# Patient Record
Sex: FEMALE | Race: OTHER | HISPANIC OR LATINO | URBAN - METROPOLITAN AREA
[De-identification: names, ages, dates, MRNs, and addresses within clinical notes are randomized per-mention and may not be internally consistent; named-entity substitution may affect disease eponyms.]

---

## 2017-02-16 ENCOUNTER — INPATIENT (INPATIENT)
Facility: HOSPITAL | Age: 37
LOS: 2 days | Discharge: HOME | End: 2017-02-19
Attending: OBSTETRICS & GYNECOLOGY | Admitting: OBSTETRICS & GYNECOLOGY
Payer: COMMERCIAL

## 2017-02-16 VITALS — WEIGHT: 191.8 LBS | HEIGHT: 62 IN

## 2017-02-16 DIAGNOSIS — O41.00X0 OLIGOHYDRAMNIOS, UNSPECIFIED TRIMESTER, NOT APPLICABLE OR UNSPECIFIED: ICD-10-CM

## 2017-02-16 LAB
BASOPHILS # BLD AUTO: 0.03 K/UL — SIGNIFICANT CHANGE UP (ref 0–0.2)
BASOPHILS NFR BLD AUTO: 0.2 % — SIGNIFICANT CHANGE UP (ref 0–2)
BLD GP AB SCN SERPL QL: NEGATIVE — SIGNIFICANT CHANGE UP
EOSINOPHIL # BLD AUTO: 0.12 K/UL — SIGNIFICANT CHANGE UP (ref 0–0.5)
EOSINOPHIL NFR BLD AUTO: 1 % — SIGNIFICANT CHANGE UP (ref 0–6)
HCT VFR BLD CALC: 36.3 % — SIGNIFICANT CHANGE UP (ref 34.5–45)
HGB BLD-MCNC: 11.7 G/DL — SIGNIFICANT CHANGE UP (ref 11.5–15.5)
IMM GRANULOCYTES NFR BLD AUTO: 0.7 % — SIGNIFICANT CHANGE UP (ref 0–1.5)
LYMPHOCYTES # BLD AUTO: 2.6 K/UL — SIGNIFICANT CHANGE UP (ref 1–3.3)
LYMPHOCYTES # BLD AUTO: 21.3 % — SIGNIFICANT CHANGE UP (ref 13–44)
MCHC RBC-ENTMCNC: 26.6 PG — LOW (ref 27–34)
MCHC RBC-ENTMCNC: 32.2 % — SIGNIFICANT CHANGE UP (ref 32–36)
MCV RBC AUTO: 82.5 FL — SIGNIFICANT CHANGE UP (ref 80–100)
MONOCYTES # BLD AUTO: 0.84 K/UL — SIGNIFICANT CHANGE UP (ref 0–0.9)
MONOCYTES NFR BLD AUTO: 6.9 % — SIGNIFICANT CHANGE UP (ref 2–14)
NEUTROPHILS # BLD AUTO: 8.52 K/UL — HIGH (ref 1.8–7.4)
NEUTROPHILS NFR BLD AUTO: 69.9 % — SIGNIFICANT CHANGE UP (ref 43–77)
PLATELET # BLD AUTO: 323 K/UL — SIGNIFICANT CHANGE UP (ref 150–400)
PMV BLD: 10.1 FL — SIGNIFICANT CHANGE UP (ref 7–13)
RBC # BLD: 4.4 M/UL — SIGNIFICANT CHANGE UP (ref 3.8–5.2)
RBC # FLD: 15.7 % — HIGH (ref 10.3–14.5)
RH IG SCN BLD-IMP: POSITIVE — SIGNIFICANT CHANGE UP
RH IG SCN BLD-IMP: POSITIVE — SIGNIFICANT CHANGE UP
WBC # BLD: 12.19 K/UL — HIGH (ref 3.8–10.5)
WBC # FLD AUTO: 12.19 K/UL — HIGH (ref 3.8–10.5)

## 2017-02-16 RX ORDER — SODIUM CHLORIDE 9 MG/ML
1000 INJECTION, SOLUTION INTRAVENOUS ONCE
Qty: 0 | Refills: 0 | Status: COMPLETED | OUTPATIENT
Start: 2017-02-16 | End: 2017-02-16

## 2017-02-16 RX ORDER — OXYTOCIN 10 UNIT/ML
2 VIAL (ML) INJECTION
Qty: 30 | Refills: 0 | Status: DISCONTINUED | OUTPATIENT
Start: 2017-02-16 | End: 2017-02-17

## 2017-02-16 RX ORDER — PENICILLIN G POTASSIUM 5000000 [IU]/1
POWDER, FOR SOLUTION INTRAMUSCULAR; INTRAPLEURAL; INTRATHECAL; INTRAVENOUS
Qty: 0 | Refills: 0 | Status: DISCONTINUED | OUTPATIENT
Start: 2017-02-16 | End: 2017-02-17

## 2017-02-16 RX ORDER — SODIUM CHLORIDE 9 MG/ML
1000 INJECTION, SOLUTION INTRAVENOUS
Qty: 0 | Refills: 0 | Status: DISCONTINUED | OUTPATIENT
Start: 2017-02-16 | End: 2017-02-17

## 2017-02-16 RX ORDER — PENICILLIN G POTASSIUM 5000000 [IU]/1
2.5 POWDER, FOR SOLUTION INTRAMUSCULAR; INTRAPLEURAL; INTRATHECAL; INTRAVENOUS EVERY 4 HOURS
Qty: 0 | Refills: 0 | Status: DISCONTINUED | OUTPATIENT
Start: 2017-02-16 | End: 2017-02-17

## 2017-02-16 RX ORDER — OXYTOCIN 10 UNIT/ML
333.33 VIAL (ML) INJECTION
Qty: 20 | Refills: 0 | Status: COMPLETED | OUTPATIENT
Start: 2017-02-16

## 2017-02-16 RX ORDER — OXYTOCIN 10 UNIT/ML
333.33 VIAL (ML) INJECTION
Qty: 20 | Refills: 0 | Status: COMPLETED | OUTPATIENT
Start: 2017-02-16 | End: 2017-02-16

## 2017-02-16 RX ORDER — PENICILLIN G POTASSIUM 5000000 [IU]/1
5 POWDER, FOR SOLUTION INTRAMUSCULAR; INTRAPLEURAL; INTRATHECAL; INTRAVENOUS ONCE
Qty: 0 | Refills: 0 | Status: COMPLETED | OUTPATIENT
Start: 2017-02-16 | End: 2017-02-16

## 2017-02-16 RX ORDER — CITRIC ACID/SODIUM CITRATE 300-500 MG
15 SOLUTION, ORAL ORAL EVERY 4 HOURS
Qty: 0 | Refills: 0 | Status: DISCONTINUED | OUTPATIENT
Start: 2017-02-16 | End: 2017-02-17

## 2017-02-16 RX ADMIN — Medication 2 MILLIUNIT(S)/MIN: at 16:30

## 2017-02-16 RX ADMIN — SODIUM CHLORIDE 125 MILLILITER(S): 9 INJECTION, SOLUTION INTRAVENOUS at 16:20

## 2017-02-16 RX ADMIN — PENICILLIN G POTASSIUM 200 MILLION UNIT(S): 5000000 POWDER, FOR SOLUTION INTRAMUSCULAR; INTRAPLEURAL; INTRATHECAL; INTRAVENOUS at 20:36

## 2017-02-16 RX ADMIN — SODIUM CHLORIDE 2000 MILLILITER(S): 9 INJECTION, SOLUTION INTRAVENOUS at 22:35

## 2017-02-16 RX ADMIN — SODIUM CHLORIDE 125 MILLILITER(S): 9 INJECTION, SOLUTION INTRAVENOUS at 20:36

## 2017-02-16 RX ADMIN — Medication 2 MILLIUNIT(S)/MIN: at 20:36

## 2017-02-16 RX ADMIN — PENICILLIN G POTASSIUM 200 MILLION UNIT(S): 5000000 POWDER, FOR SOLUTION INTRAMUSCULAR; INTRAPLEURAL; INTRATHECAL; INTRAVENOUS at 16:20

## 2017-02-17 ENCOUNTER — TRANSCRIPTION ENCOUNTER (OUTPATIENT)
Age: 37
End: 2017-02-17

## 2017-02-17 LAB
BASOPHILS # BLD AUTO: 0.01 K/UL — SIGNIFICANT CHANGE UP (ref 0–0.2)
BASOPHILS NFR BLD AUTO: 0.1 % — SIGNIFICANT CHANGE UP (ref 0–2)
EOSINOPHIL # BLD AUTO: 0.08 K/UL — SIGNIFICANT CHANGE UP (ref 0–0.5)
EOSINOPHIL NFR BLD AUTO: 0.4 % — SIGNIFICANT CHANGE UP (ref 0–6)
HCT VFR BLD CALC: 30.4 % — LOW (ref 34.5–45)
HGB BLD-MCNC: 9.7 G/DL — LOW (ref 11.5–15.5)
IMM GRANULOCYTES NFR BLD AUTO: 0.5 % — SIGNIFICANT CHANGE UP (ref 0–1.5)
LYMPHOCYTES # BLD AUTO: 13.3 % — SIGNIFICANT CHANGE UP (ref 13–44)
LYMPHOCYTES # BLD AUTO: 2.51 K/UL — SIGNIFICANT CHANGE UP (ref 1–3.3)
MCHC RBC-ENTMCNC: 26.1 PG — LOW (ref 27–34)
MCHC RBC-ENTMCNC: 31.9 % — LOW (ref 32–36)
MCV RBC AUTO: 81.9 FL — SIGNIFICANT CHANGE UP (ref 80–100)
MONOCYTES # BLD AUTO: 1.29 K/UL — HIGH (ref 0–0.9)
MONOCYTES NFR BLD AUTO: 6.8 % — SIGNIFICANT CHANGE UP (ref 2–14)
NEUTROPHILS # BLD AUTO: 14.94 K/UL — HIGH (ref 1.8–7.4)
NEUTROPHILS NFR BLD AUTO: 78.9 % — HIGH (ref 43–77)
PLATELET # BLD AUTO: 279 K/UL — SIGNIFICANT CHANGE UP (ref 150–400)
PMV BLD: 9.7 FL — SIGNIFICANT CHANGE UP (ref 7–13)
RBC # BLD: 3.71 M/UL — LOW (ref 3.8–5.2)
RBC # FLD: 15.6 % — HIGH (ref 10.3–14.5)
T PALLIDUM AB TITR SER: NEGATIVE — SIGNIFICANT CHANGE UP
WBC # BLD: 18.92 K/UL — HIGH (ref 3.8–10.5)
WBC # FLD AUTO: 18.92 K/UL — HIGH (ref 3.8–10.5)

## 2017-02-17 PROCEDURE — 93010 ELECTROCARDIOGRAM REPORT: CPT

## 2017-02-17 RX ORDER — OXYTOCIN 10 UNIT/ML
41.67 VIAL (ML) INJECTION
Qty: 20 | Refills: 0 | Status: DISCONTINUED | OUTPATIENT
Start: 2017-02-17 | End: 2017-02-17

## 2017-02-17 RX ORDER — SODIUM CHLORIDE 9 MG/ML
3 INJECTION INTRAMUSCULAR; INTRAVENOUS; SUBCUTANEOUS EVERY 8 HOURS
Qty: 0 | Refills: 0 | Status: DISCONTINUED | OUTPATIENT
Start: 2017-02-17 | End: 2017-02-17

## 2017-02-17 RX ORDER — MAGNESIUM HYDROXIDE 400 MG/1
30 TABLET, CHEWABLE ORAL
Qty: 0 | Refills: 0 | Status: DISCONTINUED | OUTPATIENT
Start: 2017-02-17 | End: 2017-02-19

## 2017-02-17 RX ORDER — OXYCODONE HYDROCHLORIDE 5 MG/1
5 TABLET ORAL
Qty: 0 | Refills: 0 | Status: DISCONTINUED | OUTPATIENT
Start: 2017-02-17 | End: 2017-02-19

## 2017-02-17 RX ORDER — OXYCODONE HYDROCHLORIDE 5 MG/1
5 TABLET ORAL EVERY 4 HOURS
Qty: 0 | Refills: 0 | Status: DISCONTINUED | OUTPATIENT
Start: 2017-02-17 | End: 2017-02-19

## 2017-02-17 RX ORDER — IBUPROFEN 200 MG
600 TABLET ORAL EVERY 6 HOURS
Qty: 0 | Refills: 0 | Status: DISCONTINUED | OUTPATIENT
Start: 2017-02-17 | End: 2017-02-19

## 2017-02-17 RX ORDER — SIMETHICONE 80 MG/1
80 TABLET, CHEWABLE ORAL EVERY 6 HOURS
Qty: 0 | Refills: 0 | Status: DISCONTINUED | OUTPATIENT
Start: 2017-02-17 | End: 2017-02-19

## 2017-02-17 RX ORDER — PANTOPRAZOLE SODIUM 20 MG/1
40 TABLET, DELAYED RELEASE ORAL DAILY
Qty: 0 | Refills: 0 | Status: DISCONTINUED | OUTPATIENT
Start: 2017-02-17 | End: 2017-02-19

## 2017-02-17 RX ORDER — DOCUSATE SODIUM 100 MG
100 CAPSULE ORAL
Qty: 0 | Refills: 0 | Status: DISCONTINUED | OUTPATIENT
Start: 2017-02-17 | End: 2017-02-19

## 2017-02-17 RX ORDER — AER TRAVELER 0.5 G/1
1 SOLUTION RECTAL; TOPICAL EVERY 4 HOURS
Qty: 0 | Refills: 0 | Status: DISCONTINUED | OUTPATIENT
Start: 2017-02-17 | End: 2017-02-17

## 2017-02-17 RX ORDER — PRAMOXINE HYDROCHLORIDE 150 MG/15G
1 AEROSOL, FOAM RECTAL EVERY 4 HOURS
Qty: 0 | Refills: 0 | Status: DISCONTINUED | OUTPATIENT
Start: 2017-02-17 | End: 2017-02-17

## 2017-02-17 RX ORDER — DIBUCAINE 1 %
1 OINTMENT (GRAM) RECTAL EVERY 4 HOURS
Qty: 0 | Refills: 0 | Status: DISCONTINUED | OUTPATIENT
Start: 2017-02-17 | End: 2017-02-17

## 2017-02-17 RX ORDER — GLYCERIN ADULT
1 SUPPOSITORY, RECTAL RECTAL AT BEDTIME
Qty: 0 | Refills: 0 | Status: DISCONTINUED | OUTPATIENT
Start: 2017-02-17 | End: 2017-02-19

## 2017-02-17 RX ORDER — DIPHENHYDRAMINE HCL 50 MG
25 CAPSULE ORAL EVERY 6 HOURS
Qty: 0 | Refills: 0 | Status: DISCONTINUED | OUTPATIENT
Start: 2017-02-17 | End: 2017-02-19

## 2017-02-17 RX ORDER — HYDROCORTISONE 1 %
1 OINTMENT (GRAM) TOPICAL EVERY 4 HOURS
Qty: 0 | Refills: 0 | Status: DISCONTINUED | OUTPATIENT
Start: 2017-02-17 | End: 2017-02-19

## 2017-02-17 RX ORDER — LANOLIN
1 OINTMENT (GRAM) TOPICAL EVERY 6 HOURS
Qty: 0 | Refills: 0 | Status: DISCONTINUED | OUTPATIENT
Start: 2017-02-17 | End: 2017-02-19

## 2017-02-17 RX ORDER — TETANUS TOXOID, REDUCED DIPHTHERIA TOXOID AND ACELLULAR PERTUSSIS VACCINE, ADSORBED 5; 2.5; 8; 8; 2.5 [IU]/.5ML; [IU]/.5ML; UG/.5ML; UG/.5ML; UG/.5ML
0.5 SUSPENSION INTRAMUSCULAR ONCE
Qty: 0 | Refills: 0 | Status: DISCONTINUED | OUTPATIENT
Start: 2017-02-17 | End: 2017-02-19

## 2017-02-17 RX ORDER — PRAMOXINE HYDROCHLORIDE 150 MG/15G
1 AEROSOL, FOAM RECTAL EVERY 4 HOURS
Qty: 0 | Refills: 0 | Status: DISCONTINUED | OUTPATIENT
Start: 2017-02-17 | End: 2017-02-19

## 2017-02-17 RX ORDER — AER TRAVELER 0.5 G/1
1 SOLUTION RECTAL; TOPICAL EVERY 4 HOURS
Qty: 0 | Refills: 0 | Status: DISCONTINUED | OUTPATIENT
Start: 2017-02-17 | End: 2017-02-19

## 2017-02-17 RX ORDER — PANTOPRAZOLE SODIUM 20 MG/1
40 TABLET, DELAYED RELEASE ORAL
Qty: 0 | Refills: 0 | Status: DISCONTINUED | OUTPATIENT
Start: 2017-02-17 | End: 2017-02-17

## 2017-02-17 RX ORDER — HYDROCORTISONE 1 %
1 OINTMENT (GRAM) TOPICAL EVERY 4 HOURS
Qty: 0 | Refills: 0 | Status: DISCONTINUED | OUTPATIENT
Start: 2017-02-17 | End: 2017-02-17

## 2017-02-17 RX ORDER — DIBUCAINE 1 %
1 OINTMENT (GRAM) RECTAL EVERY 4 HOURS
Qty: 0 | Refills: 0 | Status: DISCONTINUED | OUTPATIENT
Start: 2017-02-17 | End: 2017-02-19

## 2017-02-17 RX ORDER — KETOROLAC TROMETHAMINE 30 MG/ML
30 SYRINGE (ML) INJECTION ONCE
Qty: 0 | Refills: 0 | Status: DISCONTINUED | OUTPATIENT
Start: 2017-02-17 | End: 2017-02-19

## 2017-02-17 RX ORDER — ACETAMINOPHEN 500 MG
975 TABLET ORAL EVERY 6 HOURS
Qty: 0 | Refills: 0 | Status: DISCONTINUED | OUTPATIENT
Start: 2017-02-17 | End: 2017-02-19

## 2017-02-17 RX ADMIN — PANTOPRAZOLE SODIUM 40 MILLIGRAM(S): 20 TABLET, DELAYED RELEASE ORAL at 21:13

## 2017-02-17 RX ADMIN — PENICILLIN G POTASSIUM 200 MILLION UNIT(S): 5000000 POWDER, FOR SOLUTION INTRAMUSCULAR; INTRAPLEURAL; INTRATHECAL; INTRAVENOUS at 04:33

## 2017-02-17 RX ADMIN — Medication 600 MILLIGRAM(S): at 13:15

## 2017-02-17 RX ADMIN — Medication 975 MILLIGRAM(S): at 13:15

## 2017-02-17 RX ADMIN — Medication 1 TABLET(S): at 13:05

## 2017-02-17 RX ADMIN — PENICILLIN G POTASSIUM 200 MILLION UNIT(S): 5000000 POWDER, FOR SOLUTION INTRAMUSCULAR; INTRAPLEURAL; INTRATHECAL; INTRAVENOUS at 00:58

## 2017-02-17 RX ADMIN — Medication 600 MILLIGRAM(S): at 12:45

## 2017-02-17 RX ADMIN — Medication 975 MILLIGRAM(S): at 12:45

## 2017-02-17 RX ADMIN — Medication 125 MILLIUNIT(S)/MIN: at 08:28

## 2017-02-17 RX ADMIN — Medication 1000 MILLIUNIT(S)/MIN: at 08:27

## 2017-02-17 NOTE — DISCHARGE NOTE OB - PATIENT PORTAL LINK FT
“You can access the [employer] Patient Portal, offered by [facility], by registering with the following website: [email or web address]”

## 2017-02-17 NOTE — DISCHARGE NOTE OB - HOSPITAL COURSE
35 yo P0 at 39 + weeks oligohydramnios and early labor   augmentation with Pitocin      male infant 8 lbs 11oz  postpartum course

## 2017-02-17 NOTE — DISCHARGE NOTE OB - CARE PROVIDER_API CALL
Marianna Nicole (MD), Obstetrics and Gynecology  [address] [place] [address]  Phone: [phone]  Fax: [phone]

## 2017-02-17 NOTE — PROVIDER CONTACT NOTE (OTHER) - ASSESSMENT
Vital sign stable, no shortness of breath, no complain of dizziness. Fundus at umbilicus and firm  Pt stated she felt it before.

## 2017-02-17 NOTE — DISCHARGE NOTE OB - CARE PLAN
Principal Discharge DX:	Delivery normal  Goal:	postpartum recovery  Instructions for follow-up, activity and diet:	nothing per vagina or strenuous activity x 6 weeks / Regular diet

## 2017-02-18 RX ORDER — ACETAMINOPHEN 500 MG
3 TABLET ORAL
Qty: 0 | Refills: 0 | COMMUNITY
Start: 2017-02-18

## 2017-02-18 RX ORDER — IBUPROFEN 200 MG
1 TABLET ORAL
Qty: 0 | Refills: 0 | COMMUNITY
Start: 2017-02-18

## 2017-02-18 RX ADMIN — Medication 600 MILLIGRAM(S): at 09:34

## 2017-02-18 RX ADMIN — Medication 975 MILLIGRAM(S): at 10:03

## 2017-02-18 RX ADMIN — Medication 600 MILLIGRAM(S): at 11:03

## 2017-02-18 RX ADMIN — Medication 1 TABLET(S): at 09:34

## 2017-02-18 RX ADMIN — Medication 975 MILLIGRAM(S): at 09:33

## 2017-02-18 RX ADMIN — Medication 600 MILLIGRAM(S): at 23:37

## 2017-02-18 RX ADMIN — PANTOPRAZOLE SODIUM 40 MILLIGRAM(S): 20 TABLET, DELAYED RELEASE ORAL at 23:37

## 2017-02-19 VITALS
RESPIRATION RATE: 18 BRPM | DIASTOLIC BLOOD PRESSURE: 78 MMHG | SYSTOLIC BLOOD PRESSURE: 129 MMHG | TEMPERATURE: 98 F | HEART RATE: 88 BPM | OXYGEN SATURATION: 100 %

## 2017-02-19 RX ADMIN — Medication 975 MILLIGRAM(S): at 17:58

## 2017-02-19 RX ADMIN — Medication 1 TABLET(S): at 14:39

## 2017-02-19 RX ADMIN — Medication 600 MILLIGRAM(S): at 17:58

## 2017-02-19 RX ADMIN — Medication 600 MILLIGRAM(S): at 00:30

## 2018-07-14 ENCOUNTER — EMERGENCY (EMERGENCY)
Facility: HOSPITAL | Age: 38
LOS: 1 days | Discharge: HOME | End: 2018-07-14
Attending: EMERGENCY MEDICINE | Admitting: EMERGENCY MEDICINE
Payer: COMMERCIAL

## 2018-07-14 VITALS
RESPIRATION RATE: 16 BRPM | DIASTOLIC BLOOD PRESSURE: 82 MMHG | HEART RATE: 89 BPM | OXYGEN SATURATION: 100 % | SYSTOLIC BLOOD PRESSURE: 125 MMHG

## 2018-07-14 VITALS
OXYGEN SATURATION: 100 % | TEMPERATURE: 98 F | RESPIRATION RATE: 14 BRPM | SYSTOLIC BLOOD PRESSURE: 132 MMHG | DIASTOLIC BLOOD PRESSURE: 90 MMHG | HEART RATE: 95 BPM

## 2018-07-14 LAB
ALBUMIN SERPL ELPH-MCNC: 4 G/DL — SIGNIFICANT CHANGE UP (ref 3.3–5)
ALP SERPL-CCNC: 78 U/L — SIGNIFICANT CHANGE UP (ref 40–120)
ALT FLD-CCNC: 28 U/L — SIGNIFICANT CHANGE UP (ref 4–33)
APPEARANCE UR: SIGNIFICANT CHANGE UP
AST SERPL-CCNC: 20 U/L — SIGNIFICANT CHANGE UP (ref 4–32)
BACTERIA # UR AUTO: SIGNIFICANT CHANGE UP
BASOPHILS # BLD AUTO: 0.04 K/UL — SIGNIFICANT CHANGE UP (ref 0–0.2)
BASOPHILS NFR BLD AUTO: 0.4 % — SIGNIFICANT CHANGE UP (ref 0–2)
BILIRUB SERPL-MCNC: 0.4 MG/DL — SIGNIFICANT CHANGE UP (ref 0.2–1.2)
BILIRUB UR-MCNC: NEGATIVE — SIGNIFICANT CHANGE UP
BLOOD UR QL VISUAL: HIGH
BUN SERPL-MCNC: 9 MG/DL — SIGNIFICANT CHANGE UP (ref 7–23)
CALCIUM SERPL-MCNC: 8.6 MG/DL — SIGNIFICANT CHANGE UP (ref 8.4–10.5)
CHLORIDE SERPL-SCNC: 101 MMOL/L — SIGNIFICANT CHANGE UP (ref 98–107)
CO2 SERPL-SCNC: 25 MMOL/L — SIGNIFICANT CHANGE UP (ref 22–31)
COLOR SPEC: HIGH
CREAT SERPL-MCNC: 0.53 MG/DL — SIGNIFICANT CHANGE UP (ref 0.5–1.3)
EOSINOPHIL # BLD AUTO: 0.17 K/UL — SIGNIFICANT CHANGE UP (ref 0–0.5)
EOSINOPHIL NFR BLD AUTO: 1.9 % — SIGNIFICANT CHANGE UP (ref 0–6)
GLUCOSE SERPL-MCNC: 83 MG/DL — SIGNIFICANT CHANGE UP (ref 70–99)
GLUCOSE UR-MCNC: NEGATIVE — SIGNIFICANT CHANGE UP
HCG SERPL-ACNC: SIGNIFICANT CHANGE UP MIU/ML
HCT VFR BLD CALC: 43.7 % — SIGNIFICANT CHANGE UP (ref 34.5–45)
HGB BLD-MCNC: 13.6 G/DL — SIGNIFICANT CHANGE UP (ref 11.5–15.5)
IMM GRANULOCYTES # BLD AUTO: 0.04 # — SIGNIFICANT CHANGE UP
IMM GRANULOCYTES NFR BLD AUTO: 0.4 % — SIGNIFICANT CHANGE UP (ref 0–1.5)
KETONES UR-MCNC: SIGNIFICANT CHANGE UP
LEUKOCYTE ESTERASE UR-ACNC: HIGH
LYMPHOCYTES # BLD AUTO: 2.36 K/UL — SIGNIFICANT CHANGE UP (ref 1–3.3)
LYMPHOCYTES # BLD AUTO: 26.1 % — SIGNIFICANT CHANGE UP (ref 13–44)
MCHC RBC-ENTMCNC: 25.7 PG — LOW (ref 27–34)
MCHC RBC-ENTMCNC: 31.1 % — LOW (ref 32–36)
MCV RBC AUTO: 82.5 FL — SIGNIFICANT CHANGE UP (ref 80–100)
MONOCYTES # BLD AUTO: 0.54 K/UL — SIGNIFICANT CHANGE UP (ref 0–0.9)
MONOCYTES NFR BLD AUTO: 6 % — SIGNIFICANT CHANGE UP (ref 2–14)
MUCOUS THREADS # UR AUTO: SIGNIFICANT CHANGE UP
NEUTROPHILS # BLD AUTO: 5.9 K/UL — SIGNIFICANT CHANGE UP (ref 1.8–7.4)
NEUTROPHILS NFR BLD AUTO: 65.2 % — SIGNIFICANT CHANGE UP (ref 43–77)
NITRITE UR-MCNC: NEGATIVE — SIGNIFICANT CHANGE UP
NRBC # FLD: 0 — SIGNIFICANT CHANGE UP
PH UR: 6.5 — SIGNIFICANT CHANGE UP (ref 4.6–8)
PLATELET # BLD AUTO: 291 K/UL — SIGNIFICANT CHANGE UP (ref 150–400)
PMV BLD: 10.1 FL — SIGNIFICANT CHANGE UP (ref 7–13)
POTASSIUM SERPL-MCNC: 4 MMOL/L — SIGNIFICANT CHANGE UP (ref 3.5–5.3)
POTASSIUM SERPL-SCNC: 4 MMOL/L — SIGNIFICANT CHANGE UP (ref 3.5–5.3)
PROT SERPL-MCNC: 7.2 G/DL — SIGNIFICANT CHANGE UP (ref 6–8.3)
PROT UR-MCNC: 100 MG/DL — HIGH
RBC # BLD: 5.3 M/UL — HIGH (ref 3.8–5.2)
RBC # FLD: 14 % — SIGNIFICANT CHANGE UP (ref 10.3–14.5)
RBC CASTS # UR COMP ASSIST: >50 — HIGH (ref 0–?)
SODIUM SERPL-SCNC: 137 MMOL/L — SIGNIFICANT CHANGE UP (ref 135–145)
SP GR SPEC: 1.02 — SIGNIFICANT CHANGE UP (ref 1–1.04)
SQUAMOUS # UR AUTO: SIGNIFICANT CHANGE UP
UROBILINOGEN FLD QL: NORMAL MG/DL — SIGNIFICANT CHANGE UP
WBC # BLD: 9.05 K/UL — SIGNIFICANT CHANGE UP (ref 3.8–10.5)
WBC # FLD AUTO: 9.05 K/UL — SIGNIFICANT CHANGE UP (ref 3.8–10.5)
WBC UR QL: HIGH (ref 0–?)

## 2018-07-14 PROCEDURE — 99284 EMERGENCY DEPT VISIT MOD MDM: CPT

## 2018-07-14 PROCEDURE — 76830 TRANSVAGINAL US NON-OB: CPT | Mod: 26

## 2018-07-14 RX ORDER — CEPHALEXIN 500 MG
1 CAPSULE ORAL
Qty: 21 | Refills: 0 | OUTPATIENT
Start: 2018-07-14 | End: 2018-07-20

## 2018-07-14 NOTE — ED ADULT NURSE NOTE - OBJECTIVE STATEMENT
38 y/o female presents to ED with c/o vaginal bleeding. Pt states that she is approx 7 weeks pregnant and started having painless bleeding this morning.  Pt states that she had an US on Wednesday and states +IUP.  LMP January 2018 but states that she has irregular periods.  Pt denies dizziness, lightheadedness, no CP or SOB, denies fever chills no n/v/d.  IV established, labs drawn and sent as per order, family at bedside, will cont to monitor.

## 2018-07-14 NOTE — ED PROVIDER NOTE - OBJECTIVE STATEMENT
pt c/o vaginal bleeding which began this am  Bleeding with associated clots  no pain  Pt denies  GI sxs  Denies dysuria vomiting diarrhea or fever.  Pt with one prior pregnancy which went to term w/o complication

## 2018-07-14 NOTE — ED ADULT TRIAGE NOTE - CHIEF COMPLAINT QUOTE
Pt is 7 weeks pregnant is c/o of painless vaginal bleeding that began this morning.  Pt had and ultra sound on Wednesday had Positive IUP.  LMP end of January 2018.

## 2018-07-14 NOTE — ED PROVIDER NOTE - MEDICAL DECISION MAKING DETAILS
pt with vaginal bleeding  clinically stable  labs unremarkable  TVUS  live IUP  nl FHR  pt RH+    has gyn follow up   ua  with RBC  20-30 WBC  will provide coverage

## 2018-08-16 ENCOUNTER — ASOB RESULT (OUTPATIENT)
Age: 38
End: 2018-08-16

## 2018-08-16 ENCOUNTER — APPOINTMENT (OUTPATIENT)
Dept: ANTEPARTUM | Facility: CLINIC | Age: 38
End: 2018-08-16
Payer: COMMERCIAL

## 2018-08-16 PROCEDURE — 76813 OB US NUCHAL MEAS 1 GEST: CPT

## 2018-08-16 PROCEDURE — 76801 OB US < 14 WKS SINGLE FETUS: CPT

## 2018-08-16 PROCEDURE — 99241 OFFICE CONSULTATION NEW/ESTAB PATIENT 15 MIN: CPT | Mod: 25

## 2018-08-16 PROCEDURE — 36416 COLLJ CAPILLARY BLOOD SPEC: CPT

## 2018-08-16 PROCEDURE — 36415 COLL VENOUS BLD VENIPUNCTURE: CPT

## 2018-10-03 ENCOUNTER — OUTPATIENT (OUTPATIENT)
Dept: OUTPATIENT SERVICES | Facility: HOSPITAL | Age: 38
LOS: 1 days | End: 2018-10-03

## 2018-10-03 DIAGNOSIS — O26.899 OTHER SPECIFIED PREGNANCY RELATED CONDITIONS, UNSPECIFIED TRIMESTER: ICD-10-CM

## 2018-10-03 DIAGNOSIS — Z3A.00 WEEKS OF GESTATION OF PREGNANCY NOT SPECIFIED: ICD-10-CM

## 2018-10-09 ENCOUNTER — ASOB RESULT (OUTPATIENT)
Age: 38
End: 2018-10-09

## 2018-10-09 ENCOUNTER — APPOINTMENT (OUTPATIENT)
Dept: ANTEPARTUM | Facility: CLINIC | Age: 38
End: 2018-10-09
Payer: COMMERCIAL

## 2018-10-09 PROCEDURE — 76811 OB US DETAILED SNGL FETUS: CPT

## 2018-12-15 ENCOUNTER — TRANSCRIPTION ENCOUNTER (OUTPATIENT)
Age: 38
End: 2018-12-15

## 2018-12-18 ENCOUNTER — ASOB RESULT (OUTPATIENT)
Age: 38
End: 2018-12-18

## 2018-12-18 ENCOUNTER — APPOINTMENT (OUTPATIENT)
Dept: MATERNAL FETAL MEDICINE | Facility: CLINIC | Age: 38
End: 2018-12-18
Payer: COMMERCIAL

## 2018-12-18 ENCOUNTER — APPOINTMENT (OUTPATIENT)
Dept: ANTEPARTUM | Facility: CLINIC | Age: 38
End: 2018-12-18
Payer: COMMERCIAL

## 2018-12-18 PROCEDURE — 76819 FETAL BIOPHYS PROFIL W/O NST: CPT

## 2018-12-18 PROCEDURE — 76816 OB US FOLLOW-UP PER FETUS: CPT

## 2018-12-18 PROCEDURE — G0108 DIAB MANAGE TRN  PER INDIV: CPT

## 2019-01-03 ENCOUNTER — APPOINTMENT (OUTPATIENT)
Dept: MATERNAL FETAL MEDICINE | Facility: CLINIC | Age: 39
End: 2019-01-03
Payer: COMMERCIAL

## 2019-01-03 ENCOUNTER — ASOB RESULT (OUTPATIENT)
Age: 39
End: 2019-01-03

## 2019-01-03 PROCEDURE — G0108 DIAB MANAGE TRN  PER INDIV: CPT

## 2019-01-17 ENCOUNTER — ASOB RESULT (OUTPATIENT)
Age: 39
End: 2019-01-17

## 2019-01-17 ENCOUNTER — APPOINTMENT (OUTPATIENT)
Dept: ANTEPARTUM | Facility: CLINIC | Age: 39
End: 2019-01-17
Payer: COMMERCIAL

## 2019-01-17 ENCOUNTER — APPOINTMENT (OUTPATIENT)
Dept: MATERNAL FETAL MEDICINE | Facility: CLINIC | Age: 39
End: 2019-01-17
Payer: COMMERCIAL

## 2019-01-17 PROCEDURE — 76819 FETAL BIOPHYS PROFIL W/O NST: CPT

## 2019-01-17 PROCEDURE — 76816 OB US FOLLOW-UP PER FETUS: CPT

## 2019-01-17 PROCEDURE — G0108 DIAB MANAGE TRN  PER INDIV: CPT

## 2019-01-26 ENCOUNTER — TRANSCRIPTION ENCOUNTER (OUTPATIENT)
Age: 39
End: 2019-01-26

## 2019-01-26 ENCOUNTER — EMERGENCY (EMERGENCY)
Facility: HOSPITAL | Age: 39
LOS: 1 days | Discharge: HOME | End: 2019-01-26
Attending: EMERGENCY MEDICINE | Admitting: EMERGENCY MEDICINE
Payer: COMMERCIAL

## 2019-01-26 ENCOUNTER — OUTPATIENT (OUTPATIENT)
Dept: OUTPATIENT SERVICES | Facility: HOSPITAL | Age: 39
LOS: 1 days | End: 2019-01-26

## 2019-01-26 VITALS
HEART RATE: 106 BPM | DIASTOLIC BLOOD PRESSURE: 65 MMHG | RESPIRATION RATE: 18 BRPM | TEMPERATURE: 98 F | OXYGEN SATURATION: 100 % | SYSTOLIC BLOOD PRESSURE: 126 MMHG

## 2019-01-26 DIAGNOSIS — O26.899 OTHER SPECIFIED PREGNANCY RELATED CONDITIONS, UNSPECIFIED TRIMESTER: ICD-10-CM

## 2019-01-26 DIAGNOSIS — Z3A.00 WEEKS OF GESTATION OF PREGNANCY NOT SPECIFIED: ICD-10-CM

## 2019-01-26 LAB
ALBUMIN SERPL ELPH-MCNC: 3.7 G/DL — SIGNIFICANT CHANGE UP (ref 3.3–5)
ALP SERPL-CCNC: 134 U/L — HIGH (ref 40–120)
ALT FLD-CCNC: 20 U/L — SIGNIFICANT CHANGE UP (ref 4–33)
ANION GAP SERPL CALC-SCNC: 12 MMO/L — SIGNIFICANT CHANGE UP (ref 7–14)
AST SERPL-CCNC: 24 U/L — SIGNIFICANT CHANGE UP (ref 4–32)
BASOPHILS # BLD AUTO: 0.05 K/UL — SIGNIFICANT CHANGE UP (ref 0–0.2)
BASOPHILS NFR BLD AUTO: 0.3 % — SIGNIFICANT CHANGE UP (ref 0–2)
BILIRUB SERPL-MCNC: < 0.2 MG/DL — LOW (ref 0.2–1.2)
BUN SERPL-MCNC: 8 MG/DL — SIGNIFICANT CHANGE UP (ref 7–23)
CALCIUM SERPL-MCNC: 9.5 MG/DL — SIGNIFICANT CHANGE UP (ref 8.4–10.5)
CHLORIDE SERPL-SCNC: 105 MMOL/L — SIGNIFICANT CHANGE UP (ref 98–107)
CO2 SERPL-SCNC: 20 MMOL/L — LOW (ref 22–31)
CREAT SERPL-MCNC: 0.45 MG/DL — LOW (ref 0.5–1.3)
D DIMER BLD IA.RAPID-MCNC: 2619 NG/ML — SIGNIFICANT CHANGE UP
EOSINOPHIL # BLD AUTO: 0.26 K/UL — SIGNIFICANT CHANGE UP (ref 0–0.5)
EOSINOPHIL NFR BLD AUTO: 1.8 % — SIGNIFICANT CHANGE UP (ref 0–6)
FIBRINOGEN PPP-MCNC: > 867.1 MG/DL — HIGH (ref 350–510)
GLUCOSE SERPL-MCNC: 83 MG/DL — SIGNIFICANT CHANGE UP (ref 70–99)
HCT VFR BLD CALC: 37.4 % — SIGNIFICANT CHANGE UP (ref 34.5–45)
HGB BLD-MCNC: 11.4 G/DL — LOW (ref 11.5–15.5)
IMM GRANULOCYTES NFR BLD AUTO: 1.2 % — SIGNIFICANT CHANGE UP (ref 0–1.5)
LYMPHOCYTES # BLD AUTO: 26.7 % — SIGNIFICANT CHANGE UP (ref 13–44)
LYMPHOCYTES # BLD AUTO: 3.88 K/UL — HIGH (ref 1–3.3)
MCHC RBC-ENTMCNC: 25.1 PG — LOW (ref 27–34)
MCHC RBC-ENTMCNC: 30.5 % — LOW (ref 32–36)
MCV RBC AUTO: 82.2 FL — SIGNIFICANT CHANGE UP (ref 80–100)
MONOCYTES # BLD AUTO: 0.92 K/UL — HIGH (ref 0–0.9)
MONOCYTES NFR BLD AUTO: 6.3 % — SIGNIFICANT CHANGE UP (ref 2–14)
NEUTROPHILS # BLD AUTO: 9.22 K/UL — HIGH (ref 1.8–7.4)
NEUTROPHILS NFR BLD AUTO: 63.7 % — SIGNIFICANT CHANGE UP (ref 43–77)
NRBC # FLD: 0 K/UL — LOW (ref 25–125)
PLATELET # BLD AUTO: 349 K/UL — SIGNIFICANT CHANGE UP (ref 150–400)
PMV BLD: 10.3 FL — SIGNIFICANT CHANGE UP (ref 7–13)
POTASSIUM SERPL-MCNC: 4 MMOL/L — SIGNIFICANT CHANGE UP (ref 3.5–5.3)
POTASSIUM SERPL-SCNC: 4 MMOL/L — SIGNIFICANT CHANGE UP (ref 3.5–5.3)
PROT SERPL-MCNC: 6.9 G/DL — SIGNIFICANT CHANGE UP (ref 6–8.3)
RBC # BLD: 4.55 M/UL — SIGNIFICANT CHANGE UP (ref 3.8–5.2)
RBC # FLD: 14.6 % — HIGH (ref 10.3–14.5)
SODIUM SERPL-SCNC: 137 MMOL/L — SIGNIFICANT CHANGE UP (ref 135–145)
WBC # BLD: 14.51 K/UL — HIGH (ref 3.8–10.5)
WBC # FLD AUTO: 14.51 K/UL — HIGH (ref 3.8–10.5)

## 2019-01-26 PROCEDURE — 99284 EMERGENCY DEPT VISIT MOD MDM: CPT

## 2019-01-26 RX ORDER — ALBUTEROL 90 UG/1
1 AEROSOL, METERED ORAL EVERY 4 HOURS
Qty: 0 | Refills: 0 | Status: COMPLETED | OUTPATIENT
Start: 2019-01-26 | End: 2019-12-25

## 2019-01-26 RX ORDER — ALBUTEROL 90 UG/1
1 AEROSOL, METERED ORAL EVERY 4 HOURS
Qty: 0 | Refills: 0 | Status: DISCONTINUED | OUTPATIENT
Start: 2019-01-26 | End: 2019-01-30

## 2019-01-26 RX ORDER — IPRATROPIUM/ALBUTEROL SULFATE 18-103MCG
3 AEROSOL WITH ADAPTER (GRAM) INHALATION
Qty: 0 | Refills: 0 | Status: COMPLETED | OUTPATIENT
Start: 2019-01-26 | End: 2019-01-26

## 2019-01-26 RX ORDER — TIOTROPIUM BROMIDE 18 UG/1
1 CAPSULE ORAL; RESPIRATORY (INHALATION) DAILY
Qty: 0 | Refills: 0 | Status: DISCONTINUED | OUTPATIENT
Start: 2019-01-26 | End: 2019-01-30

## 2019-01-26 RX ADMIN — Medication 3 MILLILITER(S): at 23:33

## 2019-01-26 RX ADMIN — Medication 3 MILLILITER(S): at 23:15

## 2019-01-26 RX ADMIN — Medication 3 MILLILITER(S): at 22:55

## 2019-01-26 NOTE — ED PROVIDER NOTE - ATTENDING CONTRIBUTION TO CARE
Attending note:   After face to face evaluation of this patient, I concur with above noted hx, pe, and care plan for this patient.  39 y/o F with RAD, 35 weeks pregnant here with cough and hemoptysis.   +BRB cough.   +Fetal movement.    + Wheeze.   Treatment in progress

## 2019-01-26 NOTE — ED ADULT NURSE NOTE - OBJECTIVE STATEMENT
PT received into room 17 A&Ox4, ambulatory C/O hemoptysis x 1 day. Pt states she is 35 weeks pregnant went to urgent care today after having episode of coughing fit bringing up BRB. denies SOB, dyspnea, palpitations, fevers, chills, vaginal bleeding or discharge, urinary symptoms. + fetal movement. MD evaluated, VS as noted, 20 G L arm placed, labs sent, medicated as ordered. Family at bedside, call bell within reach, will continue to monitor for safety and comfort. PT received into room 17 A&Ox4, ambulatory C/O hemoptysis x 1 day. Pt states she is 35 weeks pregnant, has had recent UR symptoms on rescue inhaler at this time;  went to urgent care today after having episode of coughing fit bringing up BRB. denies SOB, dyspnea, palpitations, fevers, chills, vaginal bleeding or discharge, urinary symptoms. + fetal movement. MD evaluated, VS as noted, 20 G L arm placed, labs sent, medicated as ordered. Family at bedside, call bell within reach, will continue to monitor for safety and comfort.

## 2019-01-26 NOTE — ED ADULT NURSE NOTE - NSIMPLEMENTINTERV_GEN_ALL_ED
Implemented All Universal Safety Interventions:  [place] to call system. Call bell, personal items and telephone within reach. Instruct patient to call for assistance. Room bathroom lighting operational. Non-slip footwear when patient is off stretcher. Physically safe environment: no spills, clutter or unnecessary equipment. Stretcher in lowest position, wheels locked, appropriate side rails in place.

## 2019-01-26 NOTE — ED PROVIDER NOTE - MEDICAL DECISION MAKING DETAILS
38F currently 35 weeks pregnant presenting with SOB + hemoptysis, has had URI sx for the past month, will check labs including ddimer, if positive with CT, if negative will hopefully avoid as patient notes she would not like to be radiated with CT.

## 2019-01-26 NOTE — ED ADULT TRIAGE NOTE - CHIEF COMPLAINT QUOTE
Patient about 35 weeks pregnant, brought from L&D sent in by Urgent care to r/o PE. Patient c/o cough and noticed bright red blood. Patient reports chest pain during onset of cough. Patient c/o MCGARRY. Denies any PMHx.

## 2019-01-26 NOTE — ED PROVIDER NOTE - PHYSICAL EXAMINATION
Gen: uncomfortable appearing, non-toxic, conversational and polite  Eyes: PERRLA, EOMI   HENT: Normocephalic, atraumatic. External ears normal, no rhinorrhea, moist mucous membranes.   CV: RRR, no M/R/G  Resp: CTAB, non-labored, using duoneb during my evaluation  Abd: soft, gravid, non tender, non rigid, no guarding or rebound tenderness  Back: No CVAT bilaterally, no midline ttp  Skin: dry, wwp   Neuro: AOx3, speech is fluent and appropriate  Psych: Mood concerned, affect euthymic

## 2019-01-26 NOTE — ED PROVIDER NOTE - NSFOLLOWUPINSTRUCTIONS_ED_ALL_ED_FT
Follow up with your obgyn, call in the morning to schedule an appointment    Pneumonia    Pneumonia is an infection of the lungs. Pneumonia may be caused by bacteria, viruses, or funguses. Symptoms include coughing, fever, chest pain when breathing deeply or coughing, shortness of breath, fatigue, or muscle aches. Pneumonia can be diagnosed with a medical history and physical exam, as well as other tests which may include a chest X-ray. If you were prescribed an antibiotic medicine, take it as told by your health care provider and do not stop taking the antibiotic even if you start to feel better. Do not use tobacco products, including cigarettes, chewing tobacco, and e-cigarettes.    SEEK IMMEDIATE MEDICAL CARE IF YOU HAVE ANY OF THE FOLLOWING SYMPTOMS: worsening shortness of breath, worsening chest pain, coughing up blood, change in mental status, lightheadedness/dizziness.

## 2019-01-26 NOTE — ED PROVIDER NOTE - OBJECTIVE STATEMENT
35wk pregnant presenting with uri sx x1 month but now also with acute onset worsening of cough, chest pain, and shortness of breath at 2:30PM, ~9 hrs pta. Notes that she has not had this pain before and that she started coughing up blood. Went to L&D was redirected her for r/o PE. Notes that her father  of a "lung problem" at age 32 when she was 4 years ago, as did her uncle when he was young. She is not certain if it was from a clot or not. She has not been genetics tested for clotting disorders before.

## 2019-01-27 VITALS
OXYGEN SATURATION: 100 % | SYSTOLIC BLOOD PRESSURE: 130 MMHG | RESPIRATION RATE: 17 BRPM | DIASTOLIC BLOOD PRESSURE: 82 MMHG | HEART RATE: 111 BPM

## 2019-01-27 PROCEDURE — 71275 CT ANGIOGRAPHY CHEST: CPT | Mod: 26

## 2019-01-27 RX ORDER — AZITHROMYCIN 500 MG/1
500 TABLET, FILM COATED ORAL ONCE
Qty: 0 | Refills: 0 | Status: COMPLETED | OUTPATIENT
Start: 2019-01-27 | End: 2019-01-27

## 2019-01-27 RX ORDER — AZITHROMYCIN 500 MG/1
1 TABLET, FILM COATED ORAL
Qty: 4 | Refills: 0 | OUTPATIENT
Start: 2019-01-27 | End: 2019-01-30

## 2019-01-27 RX ADMIN — AZITHROMYCIN 500 MILLIGRAM(S): 500 TABLET, FILM COATED ORAL at 04:06

## 2019-01-27 RX ADMIN — Medication 1 TABLET(S): at 04:06

## 2019-01-28 NOTE — ED ADULT NURSE NOTE - DOES PATIENT HAVE ADVANCE DIRECTIVE
Mary Lou Lopez RN                  No

## 2019-01-31 ENCOUNTER — ASOB RESULT (OUTPATIENT)
Age: 39
End: 2019-01-31

## 2019-01-31 ENCOUNTER — APPOINTMENT (OUTPATIENT)
Dept: ANTEPARTUM | Facility: HOSPITAL | Age: 39
End: 2019-01-31

## 2019-01-31 ENCOUNTER — OUTPATIENT (OUTPATIENT)
Dept: OUTPATIENT SERVICES | Facility: HOSPITAL | Age: 39
LOS: 1 days | End: 2019-01-31

## 2019-01-31 ENCOUNTER — APPOINTMENT (OUTPATIENT)
Dept: ANTEPARTUM | Facility: CLINIC | Age: 39
End: 2019-01-31
Payer: COMMERCIAL

## 2019-01-31 ENCOUNTER — APPOINTMENT (OUTPATIENT)
Dept: MATERNAL FETAL MEDICINE | Facility: CLINIC | Age: 39
End: 2019-01-31
Payer: COMMERCIAL

## 2019-01-31 PROCEDURE — G0108 DIAB MANAGE TRN  PER INDIV: CPT

## 2019-01-31 PROCEDURE — 76818 FETAL BIOPHYS PROFILE W/NST: CPT | Mod: 26

## 2019-02-01 ENCOUNTER — MESSAGE (OUTPATIENT)
Age: 39
End: 2019-02-01

## 2019-02-04 DIAGNOSIS — E28.2 POLYCYSTIC OVARIAN SYNDROME: ICD-10-CM

## 2019-02-04 DIAGNOSIS — O09.523 SUPERVISION OF ELDERLY MULTIGRAVIDA, THIRD TRIMESTER: ICD-10-CM

## 2019-02-04 DIAGNOSIS — Z3A.36 36 WEEKS GESTATION OF PREGNANCY: ICD-10-CM

## 2019-02-04 DIAGNOSIS — O24.415 GESTATIONAL DIABETES MELLITUS IN PREGNANCY, CONTROLLED BY ORAL HYPOGLYCEMIC DRUGS: ICD-10-CM

## 2019-02-04 DIAGNOSIS — O36.63X0 MATERNAL CARE FOR EXCESSIVE FETAL GROWTH, THIRD TRIMESTER, NOT APPLICABLE OR UNSPECIFIED: ICD-10-CM

## 2019-02-05 ENCOUNTER — MESSAGE (OUTPATIENT)
Age: 39
End: 2019-02-05

## 2019-02-06 ENCOUNTER — TRANSCRIPTION ENCOUNTER (OUTPATIENT)
Age: 39
End: 2019-02-06

## 2019-02-06 ENCOUNTER — INPATIENT (INPATIENT)
Facility: HOSPITAL | Age: 39
LOS: 2 days | Discharge: HOME | End: 2019-02-09
Attending: OBSTETRICS & GYNECOLOGY | Admitting: OBSTETRICS & GYNECOLOGY

## 2019-02-06 DIAGNOSIS — O26.899 OTHER SPECIFIED PREGNANCY RELATED CONDITIONS, UNSPECIFIED TRIMESTER: ICD-10-CM

## 2019-02-06 DIAGNOSIS — Z3A.00 WEEKS OF GESTATION OF PREGNANCY NOT SPECIFIED: ICD-10-CM

## 2019-02-07 ENCOUNTER — APPOINTMENT (OUTPATIENT)
Dept: ANTEPARTUM | Facility: HOSPITAL | Age: 39
End: 2019-02-07

## 2019-02-07 ENCOUNTER — APPOINTMENT (OUTPATIENT)
Dept: ANTEPARTUM | Facility: CLINIC | Age: 39
End: 2019-02-07

## 2019-02-07 VITALS — WEIGHT: 188.94 LBS | HEIGHT: 62 IN

## 2019-02-07 LAB
BASOPHILS # BLD AUTO: 0.04 K/UL — SIGNIFICANT CHANGE UP (ref 0–0.2)
BASOPHILS NFR BLD AUTO: 0.4 % — SIGNIFICANT CHANGE UP (ref 0–2)
BLD GP AB SCN SERPL QL: NEGATIVE — SIGNIFICANT CHANGE UP
EOSINOPHIL # BLD AUTO: 0.08 K/UL — SIGNIFICANT CHANGE UP (ref 0–0.5)
EOSINOPHIL NFR BLD AUTO: 0.7 % — SIGNIFICANT CHANGE UP (ref 0–6)
GLUCOSE BLDC GLUCOMTR-MCNC: 90 MG/DL — SIGNIFICANT CHANGE UP (ref 70–99)
GLUCOSE BLDC GLUCOMTR-MCNC: 97 MG/DL — SIGNIFICANT CHANGE UP (ref 70–99)
GLUCOSE BLDC GLUCOMTR-MCNC: 98 MG/DL — SIGNIFICANT CHANGE UP (ref 70–99)
HCT VFR BLD CALC: 37.6 % — SIGNIFICANT CHANGE UP (ref 34.5–45)
HGB BLD-MCNC: 11.4 G/DL — LOW (ref 11.5–15.5)
IMM GRANULOCYTES NFR BLD AUTO: 0.7 % — SIGNIFICANT CHANGE UP (ref 0–1.5)
LYMPHOCYTES # BLD AUTO: 27.9 % — SIGNIFICANT CHANGE UP (ref 13–44)
LYMPHOCYTES # BLD AUTO: 3.02 K/UL — SIGNIFICANT CHANGE UP (ref 1–3.3)
MCHC RBC-ENTMCNC: 24.9 PG — LOW (ref 27–34)
MCHC RBC-ENTMCNC: 30.3 % — LOW (ref 32–36)
MCV RBC AUTO: 82.1 FL — SIGNIFICANT CHANGE UP (ref 80–100)
MONOCYTES # BLD AUTO: 0.87 K/UL — SIGNIFICANT CHANGE UP (ref 0–0.9)
MONOCYTES NFR BLD AUTO: 8 % — SIGNIFICANT CHANGE UP (ref 2–14)
NEUTROPHILS # BLD AUTO: 6.75 K/UL — SIGNIFICANT CHANGE UP (ref 1.8–7.4)
NEUTROPHILS NFR BLD AUTO: 62.3 % — SIGNIFICANT CHANGE UP (ref 43–77)
NRBC # FLD: 0 K/UL — LOW (ref 25–125)
PLATELET # BLD AUTO: 375 K/UL — SIGNIFICANT CHANGE UP (ref 150–400)
PMV BLD: 9.9 FL — SIGNIFICANT CHANGE UP (ref 7–13)
RBC # BLD: 4.58 M/UL — SIGNIFICANT CHANGE UP (ref 3.8–5.2)
RBC # FLD: 14.9 % — HIGH (ref 10.3–14.5)
RH IG SCN BLD-IMP: POSITIVE — SIGNIFICANT CHANGE UP
T PALLIDUM AB TITR SER: NEGATIVE — SIGNIFICANT CHANGE UP
WBC # BLD: 10.84 K/UL — HIGH (ref 3.8–10.5)
WBC # FLD AUTO: 10.84 K/UL — HIGH (ref 3.8–10.5)

## 2019-02-07 RX ORDER — IBUPROFEN 200 MG
600 TABLET ORAL EVERY 6 HOURS
Qty: 0 | Refills: 0 | Status: DISCONTINUED | OUTPATIENT
Start: 2019-02-07 | End: 2019-02-09

## 2019-02-07 RX ORDER — SODIUM CHLORIDE 9 MG/ML
1000 INJECTION, SOLUTION INTRAVENOUS
Qty: 0 | Refills: 0 | Status: DISCONTINUED | OUTPATIENT
Start: 2019-02-07 | End: 2019-02-07

## 2019-02-07 RX ORDER — DIBUCAINE 1 %
1 OINTMENT (GRAM) RECTAL EVERY 4 HOURS
Qty: 0 | Refills: 0 | Status: DISCONTINUED | OUTPATIENT
Start: 2019-02-07 | End: 2019-02-07

## 2019-02-07 RX ORDER — HYDROCORTISONE 1 %
1 OINTMENT (GRAM) TOPICAL EVERY 4 HOURS
Qty: 0 | Refills: 0 | Status: DISCONTINUED | OUTPATIENT
Start: 2019-02-07 | End: 2019-02-07

## 2019-02-07 RX ORDER — MAGNESIUM HYDROXIDE 400 MG/1
30 TABLET, CHEWABLE ORAL
Qty: 0 | Refills: 0 | Status: DISCONTINUED | OUTPATIENT
Start: 2019-02-07 | End: 2019-02-09

## 2019-02-07 RX ORDER — HYDROCORTISONE 1 %
1 OINTMENT (GRAM) TOPICAL EVERY 4 HOURS
Qty: 0 | Refills: 0 | Status: DISCONTINUED | OUTPATIENT
Start: 2019-02-07 | End: 2019-02-09

## 2019-02-07 RX ORDER — IBUPROFEN 200 MG
600 TABLET ORAL EVERY 6 HOURS
Qty: 0 | Refills: 0 | Status: COMPLETED | OUTPATIENT
Start: 2019-02-07 | End: 2020-01-06

## 2019-02-07 RX ORDER — AER TRAVELER 0.5 G/1
1 SOLUTION RECTAL; TOPICAL EVERY 4 HOURS
Qty: 0 | Refills: 0 | Status: DISCONTINUED | OUTPATIENT
Start: 2019-02-07 | End: 2019-02-07

## 2019-02-07 RX ORDER — SIMETHICONE 80 MG/1
80 TABLET, CHEWABLE ORAL EVERY 6 HOURS
Qty: 0 | Refills: 0 | Status: DISCONTINUED | OUTPATIENT
Start: 2019-02-07 | End: 2019-02-09

## 2019-02-07 RX ORDER — PRAMOXINE HYDROCHLORIDE 150 MG/15G
1 AEROSOL, FOAM RECTAL EVERY 4 HOURS
Qty: 0 | Refills: 0 | Status: DISCONTINUED | OUTPATIENT
Start: 2019-02-07 | End: 2019-02-07

## 2019-02-07 RX ORDER — ACETAMINOPHEN 500 MG
975 TABLET ORAL EVERY 6 HOURS
Qty: 0 | Refills: 0 | Status: DISCONTINUED | OUTPATIENT
Start: 2019-02-07 | End: 2019-02-09

## 2019-02-07 RX ORDER — DIPHENHYDRAMINE HCL 50 MG
25 CAPSULE ORAL EVERY 6 HOURS
Qty: 0 | Refills: 0 | Status: DISCONTINUED | OUTPATIENT
Start: 2019-02-07 | End: 2019-02-09

## 2019-02-07 RX ORDER — AER TRAVELER 0.5 G/1
1 SOLUTION RECTAL; TOPICAL EVERY 4 HOURS
Qty: 0 | Refills: 0 | Status: DISCONTINUED | OUTPATIENT
Start: 2019-02-07 | End: 2019-02-09

## 2019-02-07 RX ORDER — SODIUM CHLORIDE 9 MG/ML
3 INJECTION INTRAMUSCULAR; INTRAVENOUS; SUBCUTANEOUS EVERY 8 HOURS
Qty: 0 | Refills: 0 | Status: DISCONTINUED | OUTPATIENT
Start: 2019-02-07 | End: 2019-02-07

## 2019-02-07 RX ORDER — OXYCODONE HYDROCHLORIDE 5 MG/1
5 TABLET ORAL EVERY 4 HOURS
Qty: 0 | Refills: 0 | Status: DISCONTINUED | OUTPATIENT
Start: 2019-02-07 | End: 2019-02-09

## 2019-02-07 RX ORDER — SODIUM CHLORIDE 9 MG/ML
1000 INJECTION, SOLUTION INTRAVENOUS ONCE
Qty: 0 | Refills: 0 | Status: COMPLETED | OUTPATIENT
Start: 2019-02-07 | End: 2019-02-07

## 2019-02-07 RX ORDER — ACETAMINOPHEN 500 MG
975 TABLET ORAL EVERY 6 HOURS
Qty: 0 | Refills: 0 | Status: COMPLETED | OUTPATIENT
Start: 2019-02-07 | End: 2020-01-06

## 2019-02-07 RX ORDER — PRAMOXINE HYDROCHLORIDE 150 MG/15G
1 AEROSOL, FOAM RECTAL EVERY 4 HOURS
Qty: 0 | Refills: 0 | Status: DISCONTINUED | OUTPATIENT
Start: 2019-02-07 | End: 2019-02-09

## 2019-02-07 RX ORDER — OXYTOCIN 10 UNIT/ML
333.33 VIAL (ML) INJECTION
Qty: 20 | Refills: 0 | Status: DISCONTINUED | OUTPATIENT
Start: 2019-02-07 | End: 2019-02-07

## 2019-02-07 RX ORDER — LANOLIN
1 OINTMENT (GRAM) TOPICAL EVERY 6 HOURS
Qty: 0 | Refills: 0 | Status: DISCONTINUED | OUTPATIENT
Start: 2019-02-07 | End: 2019-02-09

## 2019-02-07 RX ORDER — SODIUM CHLORIDE 9 MG/ML
3 INJECTION INTRAMUSCULAR; INTRAVENOUS; SUBCUTANEOUS EVERY 8 HOURS
Qty: 0 | Refills: 0 | Status: DISCONTINUED | OUTPATIENT
Start: 2019-02-07 | End: 2019-02-09

## 2019-02-07 RX ORDER — DIBUCAINE 1 %
1 OINTMENT (GRAM) RECTAL EVERY 4 HOURS
Qty: 0 | Refills: 0 | Status: DISCONTINUED | OUTPATIENT
Start: 2019-02-07 | End: 2019-02-09

## 2019-02-07 RX ORDER — OXYCODONE HYDROCHLORIDE 5 MG/1
5 TABLET ORAL
Qty: 0 | Refills: 0 | Status: DISCONTINUED | OUTPATIENT
Start: 2019-02-07 | End: 2019-02-09

## 2019-02-07 RX ORDER — GLYCERIN ADULT
1 SUPPOSITORY, RECTAL RECTAL AT BEDTIME
Qty: 0 | Refills: 0 | Status: DISCONTINUED | OUTPATIENT
Start: 2019-02-07 | End: 2019-02-09

## 2019-02-07 RX ORDER — DOCUSATE SODIUM 100 MG
100 CAPSULE ORAL
Qty: 0 | Refills: 0 | Status: DISCONTINUED | OUTPATIENT
Start: 2019-02-08 | End: 2019-02-09

## 2019-02-07 RX ORDER — KETOROLAC TROMETHAMINE 30 MG/ML
30 SYRINGE (ML) INJECTION ONCE
Qty: 0 | Refills: 0 | Status: DISCONTINUED | OUTPATIENT
Start: 2019-02-07 | End: 2019-02-07

## 2019-02-07 RX ORDER — OXYTOCIN 10 UNIT/ML
41.67 VIAL (ML) INJECTION
Qty: 20 | Refills: 0 | Status: DISCONTINUED | OUTPATIENT
Start: 2019-02-07 | End: 2019-02-07

## 2019-02-07 RX ADMIN — SODIUM CHLORIDE 3 MILLILITER(S): 9 INJECTION INTRAMUSCULAR; INTRAVENOUS; SUBCUTANEOUS at 15:13

## 2019-02-07 RX ADMIN — SODIUM CHLORIDE 2000 MILLILITER(S): 9 INJECTION, SOLUTION INTRAVENOUS at 01:15

## 2019-02-07 RX ADMIN — Medication 1 TABLET(S): at 12:06

## 2019-02-07 RX ADMIN — SODIUM CHLORIDE 125 MILLILITER(S): 9 INJECTION, SOLUTION INTRAVENOUS at 02:19

## 2019-02-07 NOTE — DISCHARGE NOTE OB - MEDICATION SUMMARY - MEDICATIONS TO STOP TAKING
I will STOP taking the medications listed below when I get home from the hospital:    Keflex 500 mg oral capsule  -- 1 cap(s) by mouth 3 times a day MDD:3 capsules  -- Finish all this medication unless otherwise directed by prescriber.    azithromycin 250 mg oral tablet  -- 1 tab(s) by mouth once a day   -- Do not take dairy products, antacids, or iron preparations within one hour of this medication.  Finish all this medication unless otherwise directed by prescriber.    Augmentin 875 mg-125 mg oral tablet  -- 1 tab(s) by mouth 2 times a day MDD:2 tablets  -- Finish all this medication unless otherwise directed by prescriber.  Take with food or milk.

## 2019-02-07 NOTE — DISCHARGE NOTE OB - HOSPITAL COURSE
labor at term   37 yo P1 at term admitted in labor at term  progressed spontaneously and delivered - Male infant   postpartum course

## 2019-02-07 NOTE — DISCHARGE NOTE OB - PATIENT PORTAL LINK FT
You can access the [facility] Patient Portal, offered by [facility], by registering with the following website: [email or web address]

## 2019-02-07 NOTE — DISCHARGE NOTE OB - MEDICATION SUMMARY - MEDICATIONS TO TAKE
I will START or STAY ON the medications listed below when I get home from the hospital:    Prenatal Multivitamins with Folic Acid 1 mg oral tablet  -- 1 tab(s) by mouth once a day  -- Indication: For supplement I will START or STAY ON the medications listed below when I get home from the hospital:    acetaminophen 325 mg oral tablet  -- 3 tab(s) by mouth every 6 hours  -- Indication: For as needed for pain    ibuprofen 600 mg oral tablet  -- 1 tab(s) by mouth every 6 hours  -- Indication: For as needed for pain    Prenatal Multivitamins with Folic Acid 1 mg oral tablet  -- 1 tab(s) by mouth once a day  -- Indication: For supplement

## 2019-02-07 NOTE — DISCHARGE NOTE OB - ADDITIONAL INSTRUCTIONS
If severe pain, heavy bleeding , shortness of breath or any issues please call the doctor or return to the hospital

## 2019-02-07 NOTE — DISCHARGE NOTE OB - CARE PROVIDER_API CALL
Marianna Nicole)  Obstetrics and Gynecology  [address] [place] [address]  Phone: [phone]  Fax: [phone]  Follow Up Time:

## 2019-02-07 NOTE — DISCHARGE NOTE OB - MATERIALS PROVIDED
[facility] Hearing Screen Program/Back To Sleep Handout/Shaken Baby Prevention Handout/Vaccinations/Guide to Postpartum Care/Discharge Medication Information for Patients and Families Pocket Guide/[facility] [place] Screening Program/Bottle Feeding Log/  Immunization Record

## 2019-02-08 RX ADMIN — Medication 600 MILLIGRAM(S): at 13:30

## 2019-02-08 RX ADMIN — Medication 600 MILLIGRAM(S): at 23:51

## 2019-02-08 RX ADMIN — Medication 975 MILLIGRAM(S): at 23:50

## 2019-02-08 RX ADMIN — Medication 600 MILLIGRAM(S): at 06:18

## 2019-02-08 RX ADMIN — Medication 1 TABLET(S): at 13:01

## 2019-02-08 RX ADMIN — Medication 600 MILLIGRAM(S): at 05:47

## 2019-02-08 RX ADMIN — Medication 975 MILLIGRAM(S): at 12:55

## 2019-02-08 RX ADMIN — Medication 975 MILLIGRAM(S): at 13:30

## 2019-02-08 RX ADMIN — Medication 600 MILLIGRAM(S): at 12:55

## 2019-02-08 NOTE — PROGRESS NOTE ADULT - SUBJECTIVE AND OBJECTIVE BOX
Assessment and Plan    Day  1  Vaginal Delivery 2 nd degree laceration    She feels well  Mild perineal pain using perineal ointment and oral analgesics  VSS Afebrile  Fundus firm  NT -2 U  Perineum healing well  Hemorrhoids minimal edema  Lochia minimal rubra  Extremities mild pedal edema    Continue the current pain medication  Encourage  Ambulation  Encourage regular diet   DVT ppx: SCDs only when not ambulating  She is stable, tolerates a diet and has normal flatus and bowel movements  She will be discharged on Day 2 according to the normal criteria.  Discharge tomorrow  Instructions given RTO for post partum visit

## 2019-02-08 NOTE — PROGRESS NOTE ADULT - SUBJECTIVE AND OBJECTIVE BOX
Anesthesia Post-op Note    POD#1 S/P     Patient is doing well.  OOBAA. Tolerating clears.  Pain is tolerable. Denies HA. Denies numbness/tingliness/pain in LE.  No residual anesthetic issues or complications noted.  T(C): 36.9 (19 @ 05:52), Max: 36.9 (19 @ 05:52)  HR: 81 (19 @ 05:52) (77 - 94)  BP: 101/68 (19 @ 05:52) (101/68 - 127/78)  RR: 16 (19 @ 05:52) (16 - 18)  SpO2: 97% (19 @ 05:52) (97% - 100%)

## 2019-02-09 VITALS
TEMPERATURE: 98 F | SYSTOLIC BLOOD PRESSURE: 119 MMHG | OXYGEN SATURATION: 99 % | RESPIRATION RATE: 17 BRPM | DIASTOLIC BLOOD PRESSURE: 83 MMHG | HEART RATE: 95 BPM

## 2019-02-09 RX ORDER — IBUPROFEN 200 MG
1 TABLET ORAL
Qty: 0 | Refills: 0 | DISCHARGE
Start: 2019-02-09

## 2019-02-09 RX ORDER — ACETAMINOPHEN 500 MG
3 TABLET ORAL
Qty: 0 | Refills: 0 | DISCHARGE
Start: 2019-02-09

## 2019-02-09 RX ADMIN — Medication 975 MILLIGRAM(S): at 00:24

## 2019-02-09 RX ADMIN — Medication 600 MILLIGRAM(S): at 00:24

## 2019-05-16 ENCOUNTER — APPOINTMENT (OUTPATIENT)
Dept: SURGICAL ONCOLOGY | Facility: CLINIC | Age: 39
End: 2019-05-16
Payer: COMMERCIAL

## 2019-05-16 PROCEDURE — 99243 OFF/OP CNSLTJ NEW/EST LOW 30: CPT

## 2019-05-20 ENCOUNTER — TRANSCRIPTION ENCOUNTER (OUTPATIENT)
Age: 39
End: 2019-05-20

## 2019-05-21 ENCOUNTER — APPOINTMENT (OUTPATIENT)
Dept: RADIOLOGY | Facility: HOSPITAL | Age: 39
End: 2019-05-21

## 2019-05-23 ENCOUNTER — OUTPATIENT (OUTPATIENT)
Dept: OUTPATIENT SERVICES | Facility: HOSPITAL | Age: 39
LOS: 1 days | End: 2019-05-23
Payer: COMMERCIAL

## 2019-05-23 ENCOUNTER — APPOINTMENT (OUTPATIENT)
Dept: ULTRASOUND IMAGING | Facility: HOSPITAL | Age: 39
End: 2019-05-23
Payer: COMMERCIAL

## 2019-05-23 DIAGNOSIS — R22.2 LOCALIZED SWELLING, MASS AND LUMP, TRUNK: ICD-10-CM

## 2019-05-23 PROCEDURE — 76882 US LMTD JT/FCL EVL NVASC XTR: CPT | Mod: 26,LT

## 2019-05-23 PROCEDURE — 71046 X-RAY EXAM CHEST 2 VIEWS: CPT | Mod: 26

## 2019-06-05 PROBLEM — Z00.00 ENCOUNTER FOR PREVENTIVE HEALTH EXAMINATION: Noted: 2019-06-05

## 2019-06-11 ENCOUNTER — OUTPATIENT (OUTPATIENT)
Dept: OUTPATIENT SERVICES | Facility: HOSPITAL | Age: 39
LOS: 1 days | End: 2019-06-11
Payer: COMMERCIAL

## 2019-06-11 ENCOUNTER — APPOINTMENT (OUTPATIENT)
Dept: MRI IMAGING | Facility: HOSPITAL | Age: 39
End: 2019-06-11
Payer: COMMERCIAL

## 2019-06-11 DIAGNOSIS — R22.2 LOCALIZED SWELLING, MASS AND LUMP, TRUNK: ICD-10-CM

## 2019-06-11 PROCEDURE — 71552 MRI CHEST W/O & W/DYE: CPT | Mod: 26

## 2019-06-28 NOTE — REASON FOR VISIT
[Initial Consultation] : an initial consultation for [Other: _____] : [unfilled] [FreeTextEntry2] : Back mass

## 2019-06-28 NOTE — ASSESSMENT
[FreeTextEntry1] : Regarding her back mass, I suggested imaging for further characterization.\par She is in agreement, and prescriptions entered for a [facility] x-[facility]par \par Additional management will be based upon the results of diagnostic studies.\par Even if there are no imaging abnormalities, she prefers excision, we will coordinate this with plastics since there is a significant amount of skin tension in the region.\par Paperwork submitted to surgical booking.\par \par Reviewed in detail, all questions answered.\par \par Note dictated\par \par \par 06-04-19.\par She and I spoke.\par Her May 23, 2019, [facility] upper [facility] identified a 4 cm soft tissue mass, 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, but incompletely characterized with regards to internal appearance, and depth of extension.\par She understands and agrees.\par Prescription entered for MRI.\par Await results of accompanying chest x-ray.: NOT Done since on January 27, 2019, she had a CT chest to evaluate for pulmonary embolism.\par No PE noted.\par Mild bilateral lower lobe groundglass attenuation.\par No evidence of malignancy.\par \par \par 06-14-19.\par She and I spoke.\par On June 11, 2019, she had an [facility] of the thorax [facility].\par The study demonstrates a 1.7 x 4.5 x 4.5 cm encapsulated soft tissue mass consistent with a lipoma.\par I presented the options of expectant management, or excision with reconstruction, the latter is her preference.\par I will schedule the operation on June 17

## 2019-06-28 NOTE — REVIEW OF SYSTEMS
[Negative] : Heme/Lymph [FreeTextEntry8] : Heartburn [FreeTextEntry7] : Environmental allergies [de-identified] : Back mass [de-identified] : Gestational DM

## 2019-06-28 NOTE — HISTORY OF PRESENT ILLNESS
[de-identified] : 38-year-old lady being referred by Dr. Reuben WRIGHT with a soft tissue mass of her BECKY BACK.\par \par This is an asymptomatic mass that she has been aware of for the past year, and it has been slowly growing.\par \par She has no other lumps or bumps presently.\par \par No personal history of malignancy.\par \par The only relative with a history of cancers her paternal aunt with a stomach malignancy.\par \par January 2019 she had an episode of HEMOPTYSIS\par She went to urgent care who transferred her to [facility].\par She was pregnant at that time, hospitalized, and monitored.\par An extensive evaluation is unremarkable, no source of her identified.\par Problem was self-limited, and has not recurred.\par \par \par Her gynecologist is Dr. Marianna NEVAREZ\par +hx of PCOS.\par + Gestational diabetes\par She remains on metformin not for control of her glucose, but because of a beneficial effect for her PCOS.\par Her only other medication is pantoprazole for heartburn, no upper endoscopy.\par She uses over-the-counter Zyrtec for environmental allergies.\par \par She is presently 3 months postpartum.\par \par Her internist is Dr. Brian MARQUEZ

## 2019-10-11 ENCOUNTER — APPOINTMENT (OUTPATIENT)
Dept: SURGICAL ONCOLOGY | Facility: HOSPITAL | Age: 39
End: 2019-10-11

## 2019-10-20 ENCOUNTER — TRANSCRIPTION ENCOUNTER (OUTPATIENT)
Age: 39
End: 2019-10-20

## 2019-12-15 ENCOUNTER — TRANSCRIPTION ENCOUNTER (OUTPATIENT)
Age: 39
End: 2019-12-15

## 2020-03-11 ENCOUNTER — APPOINTMENT (OUTPATIENT)
Dept: MRI IMAGING | Facility: HOSPITAL | Age: 40
End: 2020-03-11
Payer: COMMERCIAL

## 2020-03-11 ENCOUNTER — OUTPATIENT (OUTPATIENT)
Dept: OUTPATIENT SERVICES | Facility: HOSPITAL | Age: 40
LOS: 1 days | End: 2020-03-11
Payer: COMMERCIAL

## 2020-03-11 DIAGNOSIS — G54.2 CERVICAL ROOT DISORDERS, NOT ELSEWHERE CLASSIFIED: ICD-10-CM

## 2020-03-11 DIAGNOSIS — R51 HEADACHE: ICD-10-CM

## 2020-03-11 PROCEDURE — 72141 MRI NECK SPINE W/O DYE: CPT | Mod: 26

## 2020-03-11 PROCEDURE — 70551 MRI BRAIN STEM W/O DYE: CPT | Mod: 26

## 2020-07-11 ENCOUNTER — TRANSCRIPTION ENCOUNTER (OUTPATIENT)
Age: 40
End: 2020-07-11

## 2022-05-05 ENCOUNTER — APPOINTMENT (OUTPATIENT)
Dept: ULTRASOUND IMAGING | Facility: CLINIC | Age: 42
End: 2022-05-05

## 2022-05-05 ENCOUNTER — OUTPATIENT (OUTPATIENT)
Dept: OUTPATIENT SERVICES | Facility: HOSPITAL | Age: 42
LOS: 1 days | End: 2022-05-05
Payer: COMMERCIAL

## 2022-05-05 DIAGNOSIS — Z00.8 ENCOUNTER FOR OTHER GENERAL EXAMINATION: ICD-10-CM

## 2022-05-05 PROCEDURE — 76830 TRANSVAGINAL US NON-OB: CPT

## 2022-05-05 PROCEDURE — 76830 TRANSVAGINAL US NON-OB: CPT | Mod: 26

## 2022-05-10 ENCOUNTER — APPOINTMENT (OUTPATIENT)
Dept: ULTRASOUND IMAGING | Facility: CLINIC | Age: 42
End: 2022-05-10
Payer: COMMERCIAL

## 2022-05-10 ENCOUNTER — APPOINTMENT (OUTPATIENT)
Dept: MAMMOGRAPHY | Facility: CLINIC | Age: 42
End: 2022-05-10
Payer: COMMERCIAL

## 2022-05-10 ENCOUNTER — OUTPATIENT (OUTPATIENT)
Dept: OUTPATIENT SERVICES | Facility: HOSPITAL | Age: 42
LOS: 1 days | End: 2022-05-10
Payer: COMMERCIAL

## 2022-05-10 DIAGNOSIS — Z00.8 ENCOUNTER FOR OTHER GENERAL EXAMINATION: ICD-10-CM

## 2022-05-10 PROCEDURE — 77067 SCR MAMMO BI INCL CAD: CPT

## 2022-05-10 PROCEDURE — 77067 SCR MAMMO BI INCL CAD: CPT | Mod: 26

## 2022-05-10 PROCEDURE — 77063 BREAST TOMOSYNTHESIS BI: CPT

## 2022-05-10 PROCEDURE — 76641 ULTRASOUND BREAST COMPLETE: CPT | Mod: 26,LT

## 2022-05-10 PROCEDURE — 76641 ULTRASOUND BREAST COMPLETE: CPT

## 2022-05-10 PROCEDURE — 76641 ULTRASOUND BREAST COMPLETE: CPT | Mod: 26,RT

## 2022-05-10 PROCEDURE — 77063 BREAST TOMOSYNTHESIS BI: CPT | Mod: 26

## 2022-05-11 ENCOUNTER — TRANSCRIPTION ENCOUNTER (OUTPATIENT)
Age: 42
End: 2022-05-11

## 2022-06-12 PROBLEM — R22.2 MASS ON BACK: Status: ACTIVE | Noted: 2019-05-16

## 2022-06-12 RX ORDER — METFORMIN HYDROCHLORIDE 1000 MG/1
1000 TABLET, COATED ORAL
Qty: 180 | Refills: 0 | Status: ACTIVE | COMMUNITY
Start: 2021-07-14

## 2022-06-12 RX ORDER — PANTOPRAZOLE 40 MG/1
40 TABLET, DELAYED RELEASE ORAL
Qty: 90 | Refills: 0 | Status: ACTIVE | COMMUNITY
Start: 2021-07-15

## 2022-06-13 ENCOUNTER — APPOINTMENT (OUTPATIENT)
Dept: SURGICAL ONCOLOGY | Facility: CLINIC | Age: 42
End: 2022-06-13
Payer: COMMERCIAL

## 2022-06-13 VITALS
HEART RATE: 97 BPM | OXYGEN SATURATION: 98 % | SYSTOLIC BLOOD PRESSURE: 117 MMHG | DIASTOLIC BLOOD PRESSURE: 77 MMHG | RESPIRATION RATE: 16 BRPM | HEIGHT: 62 IN | TEMPERATURE: 97.4 F | WEIGHT: 178 LBS | BODY MASS INDEX: 32.76 KG/M2

## 2022-06-13 DIAGNOSIS — R22.2 LOCALIZED SWELLING, MASS AND LUMP, TRUNK: ICD-10-CM

## 2022-06-13 PROCEDURE — 99204 OFFICE O/P NEW MOD 45 MIN: CPT

## 2022-06-22 ENCOUNTER — RESULT REVIEW (OUTPATIENT)
Age: 42
End: 2022-06-22

## 2022-07-02 ENCOUNTER — OUTPATIENT (OUTPATIENT)
Dept: OUTPATIENT SERVICES | Facility: HOSPITAL | Age: 42
LOS: 1 days | End: 2022-07-02
Payer: COMMERCIAL

## 2022-07-02 ENCOUNTER — APPOINTMENT (OUTPATIENT)
Dept: ULTRASOUND IMAGING | Facility: CLINIC | Age: 42
End: 2022-07-02

## 2022-07-02 DIAGNOSIS — Z00.8 ENCOUNTER FOR OTHER GENERAL EXAMINATION: ICD-10-CM

## 2022-07-02 PROCEDURE — 76882 US LMTD JT/FCL EVL NVASC XTR: CPT | Mod: 26,LT

## 2022-07-02 PROCEDURE — 76882 US LMTD JT/FCL EVL NVASC XTR: CPT

## 2022-07-11 ENCOUNTER — RESULT REVIEW (OUTPATIENT)
Age: 42
End: 2022-07-11

## 2022-09-01 ENCOUNTER — APPOINTMENT (OUTPATIENT)
Dept: PEDIATRIC ALLERGY IMMUNOLOGY | Facility: CLINIC | Age: 42
End: 2022-09-01

## 2022-09-01 DIAGNOSIS — Z78.9 OTHER SPECIFIED HEALTH STATUS: ICD-10-CM

## 2022-09-01 DIAGNOSIS — J30.89 OTHER ALLERGIC RHINITIS: ICD-10-CM

## 2022-09-01 DIAGNOSIS — Z83.6 FAMILY HISTORY OF OTHER DISEASES OF THE RESPIRATORY SYSTEM: ICD-10-CM

## 2022-09-01 DIAGNOSIS — G47.30 SLEEP APNEA, UNSPECIFIED: ICD-10-CM

## 2022-09-01 DIAGNOSIS — Z99.89 DEPENDENCE ON OTHER ENABLING MACHINES AND DEVICES: ICD-10-CM

## 2022-09-01 DIAGNOSIS — O99.810 ABNORMAL GLUCOSE COMPLICATING PREGNANCY: ICD-10-CM

## 2022-09-01 DIAGNOSIS — L50.8 OTHER URTICARIA: ICD-10-CM

## 2022-09-01 PROCEDURE — 99204 OFFICE O/P NEW MOD 45 MIN: CPT | Mod: 95

## 2022-09-01 RX ORDER — INSULIN LISPRO 100 [IU]/ML
100 INJECTION, SOLUTION INTRAVENOUS; SUBCUTANEOUS
Qty: 1 | Refills: 0 | Status: COMPLETED | COMMUNITY
Start: 2019-01-31 | End: 2022-09-01

## 2022-09-01 RX ORDER — FAMOTIDINE 40 MG/1
40 TABLET, FILM COATED ORAL
Qty: 30 | Refills: 0 | Status: COMPLETED | COMMUNITY
Start: 2022-06-08 | End: 2022-09-01

## 2022-09-01 RX ORDER — CEFUROXIME AXETIL 500 MG/1
500 TABLET ORAL
Qty: 14 | Refills: 0 | Status: COMPLETED | COMMUNITY
Start: 2022-02-01 | End: 2022-09-01

## 2022-09-01 RX ORDER — CETIRIZINE HYDROCHLORIDE 10 MG/1
10 TABLET, COATED ORAL
Refills: 0 | Status: ACTIVE | COMMUNITY

## 2022-09-01 RX ORDER — PEN NEEDLE, DIABETIC 29 G X1/2"
32G X 4 MM NEEDLE, DISPOSABLE MISCELLANEOUS
Qty: 1 | Refills: 1 | Status: COMPLETED | COMMUNITY
Start: 2019-01-31 | End: 2022-09-01

## 2022-09-01 RX ORDER — FLUTICASONE PROPIONATE 50 UG/1
50 SPRAY, METERED NASAL DAILY
Qty: 1 | Refills: 2 | Status: ACTIVE | COMMUNITY
Start: 2022-09-01 | End: 1900-01-01

## 2022-09-01 RX ORDER — MONTELUKAST 10 MG/1
10 TABLET, FILM COATED ORAL
Refills: 0 | Status: ACTIVE | COMMUNITY

## 2022-09-01 RX ORDER — METHYLPREDNISOLONE 4 MG/1
4 TABLET ORAL
Qty: 21 | Refills: 0 | Status: COMPLETED | COMMUNITY
Start: 2022-04-12 | End: 2022-09-01

## 2022-09-01 RX ORDER — HYDROCORTISONE 25 MG/G
2.5 OINTMENT TOPICAL
Qty: 454 | Refills: 0 | Status: COMPLETED | COMMUNITY
Start: 2021-12-27 | End: 2022-09-01

## 2022-09-01 NOTE — CONSULT LETTER
[Dear  ___] : Dear  [unfilled], [Consult Letter:] : I had the pleasure of evaluating your patient, [unfilled]. [Please see my note below.] : Please see my note below. [Consult Closing:] : Thank you very much for allowing me to participate in the care of this patient.  If you have any questions, please do not hesitate to contact me. [Sincerely,] : Sincerely, [FreeTextEntry3] : Heather Moreno MD FAALIZBETH, SAMMY\par Adult and Pediatric Allergy, Asthma and Clinical Immunology\par  of Medicine and Pediatrics at\par   [facility]\par Section Head, Adult Allergy and Immunology\par   [facility]\par   Division of Allergy, Asthma and Immunology\par   [address] [place] [address], [place] [address]\par   [place] [address]\par   Phone [phone]  Fax [phone]\par \par

## 2022-09-01 NOTE — HISTORY OF PRESENT ILLNESS
[Other Location: e.g. School (Enter Location, City,State)___] : at [unfilled], at the time of the visit. [Other Location: e.g. Home (Enter Location, City,State)___] : at [unfilled] [Verbal consent obtained from patient] : the patient, [unfilled] [Asthma] : asthma [Eczematous rashes] : eczematous rashes [Venom Reactions] : venom reactions [Food Allergies] : food allergies [de-identified] : ANGELICA REYES is a 42 year  old female with JANN, PCOS, GERD, hyperlipidemia,  presents for allergy evaluation. \par She developed hives [birth year] her [birth year], not associated with any foods and lasted abut 3 weeks. Hives started on her Left arm then traveled to right arm and her legs. The rash was itchy. She saw a dermatologist. She was taking cetirizine for her environmental dust mite allergy. Dermatologist has increased cetirizine to 3 times a day and added Diphenhydramine/Benadryl at night.\par She noticed that she still gets occasional hives when she stressed. She continues with daily cetirizine 10 mg. \par - There is no association of hives with  meals, type of food eaten or specific activities/exposures. \par - Hives are fleeting and individual hives  typically last the rest of the day. She takes Diphenhydramine/Benadryl at night and next morning she wakes up with no rashes. \par - She  gets no associated swelling. She  denies associated shortness of breath or other allergic symptoms. \par - She does get some pressure urticaria. \par - There is  no association of urticaria with taking NSAIDs\par - She tried cetirizine and Diphenhydramine/Benadryl. \par - She tried Fexofenadine and didn't work\par \par During her 2nd pregnancy she had gestational diabetes and developed cough, she was coughing up blood. It wasn't PE, reportedly. Subsequently, she developed sleep apnoe, uses CPAP every night. She sees an outside pulmonologist. She was never diagnosed with asthma.\par She was prescribed Montelukast for persistent cough  about 1.5 year ago and continues to take it. \par \par She was tested for environmental allergies 7 years ago, tested positive to dust mites. \par She reports abnormal sinus CT 5-6 years ago, was offered surgery but never had it. \par She eats and tolerates unrestricted diet. \par \par She received Pfizer-BioNTech COVID-19 Vaccine # 3, last shot- 1/15/22.

## 2022-09-01 NOTE — PHYSICAL EXAM
[Alert] : alert [Well Nourished] : well nourished [Healthy Appearance] : healthy appearance [No Acute Distress] : no acute distress [Sclera Not Icteric] : sclera not icteric [Normal Rate and Effort] : normal respiratory rhythm and effort [Normal Mood] : mood was normal [Normal Affect] : affect was normal [Alert, Awake, Oriented as Age-Appropriate] : alert, awake, oriented as age appropriate

## 2022-09-01 NOTE — REVIEW OF SYSTEMS
[Nasal Congestion] : nasal congestion [Snoring] : snoring [Post Nasal Drip] : post nasal drip [Sneezing] : sneezing [Heartburn] : heartburn [Headache] : headache [Urticaria] : urticaria [Pruritus] : pruritus [Recurrent Sinus Infections] : recurrent sinus infections [Nl] : Hematologic/Lymphatic [SOB at Rest] : no shortness of breath at rest [SOB with Exertion] : no dyspnea on exertion [Cough] : no cough [Congested In The Chest] : not feeling ~L congested in the chest [Wheezing] : no wheezing [Atopic Dermatitis] : no atopic dermatitis [Swelling] : no swelling [Recurrent Throat Infections] : no recurrence of throat infections [Recurrent Bronchitis] : no recurrent bronchitis [Recurrent Ear Infections] : no recurrence or ear infections [Recurrent Skin Infections] : no recurrent skin infections [Recurrent Pneumonia] : no ~T recurrent pneumonia [FreeTextEntry4] : perennial [de-identified] : colds tend to linger

## 2022-09-26 ENCOUNTER — OUTPATIENT (OUTPATIENT)
Dept: OUTPATIENT SERVICES | Facility: HOSPITAL | Age: 42
LOS: 1 days | End: 2022-09-26
Payer: COMMERCIAL

## 2022-09-26 VITALS
OXYGEN SATURATION: 98 % | RESPIRATION RATE: 18 BRPM | DIASTOLIC BLOOD PRESSURE: 86 MMHG | SYSTOLIC BLOOD PRESSURE: 123 MMHG | HEIGHT: 62 IN | TEMPERATURE: 99 F | HEART RATE: 101 BPM | WEIGHT: 179.02 LBS

## 2022-09-26 DIAGNOSIS — R22.2 LOCALIZED SWELLING, MASS AND LUMP, TRUNK: ICD-10-CM

## 2022-09-26 DIAGNOSIS — Z01.818 ENCOUNTER FOR OTHER PREPROCEDURAL EXAMINATION: ICD-10-CM

## 2022-09-26 DIAGNOSIS — Z98.890 OTHER SPECIFIED POSTPROCEDURAL STATES: Chronic | ICD-10-CM

## 2022-09-26 DIAGNOSIS — G47.33 OBSTRUCTIVE SLEEP APNEA (ADULT) (PEDIATRIC): ICD-10-CM

## 2022-09-26 DIAGNOSIS — E78.5 HYPERLIPIDEMIA, UNSPECIFIED: ICD-10-CM

## 2022-09-26 DIAGNOSIS — K21.9 GASTRO-ESOPHAGEAL REFLUX DISEASE WITHOUT ESOPHAGITIS: ICD-10-CM

## 2022-09-26 LAB
ANION GAP SERPL CALC-SCNC: 8 MMOL/L — SIGNIFICANT CHANGE UP (ref 5–17)
BUN SERPL-MCNC: 11 MG/DL — SIGNIFICANT CHANGE UP (ref 7–23)
CALCIUM SERPL-MCNC: 9 MG/DL — SIGNIFICANT CHANGE UP (ref 8.4–10.5)
CHLORIDE SERPL-SCNC: 102 MMOL/L — SIGNIFICANT CHANGE UP (ref 96–108)
CO2 SERPL-SCNC: 30 MMOL/L — SIGNIFICANT CHANGE UP (ref 22–31)
CREAT SERPL-MCNC: 0.66 MG/DL — SIGNIFICANT CHANGE UP (ref 0.5–1.3)
EGFR: 113 ML/MIN/1.73M2 — SIGNIFICANT CHANGE UP
GLUCOSE SERPL-MCNC: 99 MG/DL — SIGNIFICANT CHANGE UP (ref 70–99)
HCT VFR BLD CALC: 41.9 % — SIGNIFICANT CHANGE UP (ref 34.5–45)
HGB BLD-MCNC: 13.5 G/DL — SIGNIFICANT CHANGE UP (ref 11.5–15.5)
MCHC RBC-ENTMCNC: 27.6 PG — SIGNIFICANT CHANGE UP (ref 27–34)
MCHC RBC-ENTMCNC: 32.2 GM/DL — SIGNIFICANT CHANGE UP (ref 32–36)
MCV RBC AUTO: 85.5 FL — SIGNIFICANT CHANGE UP (ref 80–100)
NRBC # BLD: 0 /100 WBCS — SIGNIFICANT CHANGE UP (ref 0–0)
PLATELET # BLD AUTO: 252 K/UL — SIGNIFICANT CHANGE UP (ref 150–400)
POTASSIUM SERPL-MCNC: 3.7 MMOL/L — SIGNIFICANT CHANGE UP (ref 3.5–5.3)
POTASSIUM SERPL-SCNC: 3.7 MMOL/L — SIGNIFICANT CHANGE UP (ref 3.5–5.3)
RBC # BLD: 4.9 M/UL — SIGNIFICANT CHANGE UP (ref 3.8–5.2)
RBC # FLD: 13.2 % — SIGNIFICANT CHANGE UP (ref 10.3–14.5)
SODIUM SERPL-SCNC: 140 MMOL/L — SIGNIFICANT CHANGE UP (ref 135–145)
WBC # BLD: 8.81 K/UL — SIGNIFICANT CHANGE UP (ref 3.8–10.5)
WBC # FLD AUTO: 8.81 K/UL — SIGNIFICANT CHANGE UP (ref 3.8–10.5)

## 2022-09-26 PROCEDURE — G0463: CPT

## 2022-09-26 PROCEDURE — 85027 COMPLETE CBC AUTOMATED: CPT

## 2022-09-26 PROCEDURE — 93010 ELECTROCARDIOGRAM REPORT: CPT | Mod: NC

## 2022-09-26 PROCEDURE — 36415 COLL VENOUS BLD VENIPUNCTURE: CPT

## 2022-09-26 PROCEDURE — 93005 ELECTROCARDIOGRAM TRACING: CPT

## 2022-09-26 PROCEDURE — 80048 BASIC METABOLIC PNL TOTAL CA: CPT

## 2022-09-26 RX ORDER — SODIUM CHLORIDE 9 MG/ML
1000 INJECTION, SOLUTION INTRAVENOUS
Refills: 0 | Status: DISCONTINUED | OUTPATIENT
Start: 2022-10-12 | End: 2022-10-12

## 2022-09-26 NOTE — H&P PST ADULT - NEUROLOGICAL
Anesthesia Pre Eval Note    Anesthesia ROS/Med Hx    Overall Review:  EKG was reviewed and Echo was reviewed     Anesthetic Complication History:  Patient does not have a history of anesthetic complications      Pulmonary Review:  Comments: Hypoxic respiratory failure - unclear etiology - 4 LPMPositive for pneumonia    Neuro/Psych Review:  Patient does not have a neuro/psych history       Cardiovascular Review:  Comments: 6/22 QUITA:  Structurally normal tricuspid valve - there is some thickening of the subvalvular apparatus (see view 36, 37 etc) but nothing that would  suggest thrombus or vegetation etc.  No tricuspid valve stenosis.  Trivial tricuspid valve regurgitation.    6/22 TTE:  Limited echo performed.  Technically limited study.  Normal left ventricular chamber size, wall thickness and systolic functio. LV EF 55-60%.  Mildly increased right ventricular chamber size.  Mildly decreased right ventricular systolic function.  Severe pulmonary hypertension; RVSP 62 mmHg.  Flattening of the septum during systole suggestive of RV pressure overload.  Increased right atrial chamber size.  Thickened tricuspid valve.  The previously noted mass density attached to the anterior leaflet in not visualized in this study.  The septal tricuspid leaflet is thickened, and there may a vague mass density but not clear-cut. This was not previously visualized  though may be artifactual.  Suggest QUITA to more fully assess the findings, if clinically warranted. There are some changes compared to 5/4/2022.    Positive for CHF  Positive for pulmonary hypertension (Severe per TTE:)  Positive for RETANA/ SOB    End/Other Review:    Positive for chronic pain  Positive for cancer    Overall Review of Systems Comments:  PE on eliquis    Additional Results:     ALLERGIES:  No Known Allergies       Last Labs        Component                Value               Date/Time                  WBC                      10.7                07/08/2022 0844             RBC                      3.88 (L)            07/08/2022 0844            HGB                      11.3 (L)            07/08/2022 0844            HCT                      35.0 (L)            07/08/2022 0844            MCV                      90.2                07/08/2022 0844            MCH                      29.1                07/08/2022 0844            MCHC                     32.3                07/08/2022 0844            RDW-CV                   18.0 (H)            07/08/2022 0844            Sodium                   140                 07/08/2022 0844            Potassium                3.7                 07/08/2022 0844            Chloride                 102                 07/08/2022 0844            Carbon Dioxide           30                  07/08/2022 0844            Glucose                  101 (H)             07/08/2022 0844            BUN                      28 (H)              07/08/2022 0844            Creatinine               0.91                07/08/2022 0844            Glomerular Filtrati*     74                  07/08/2022 0844            Calcium                  9.6                 07/08/2022 0844            PLT                      273                 07/08/2022 0844            PTT                      32 (H)              06/02/2022 1514            INR                      1.0                 06/10/2022 0618        Past Medical History:  2021: Blood clot associated with vein wall inflammation      Comment:  Pulmonary embolism  No date: Chronic pain  No date: Congestive cardiac failure (CMS/HCC)  No date: Fracture  No date: History of adverse response to anesthesia      Comment:  H/A after surgery in the past  05/06/2022: Intracardiac thrombus  No date: Malignant neoplasm (CMS/HCC)      Comment:  Lung cancer  No date: Pneumonia  05/06/2022: Pulmonary hypertension (CMS/HCC)    Past Surgical History:  6/8/2022: Bronchoscopy,diagnostic w lavage      Comment:     No date:  Hysterectomy  No date: Removal gallbladder       Prior to Admission medications :  Medication famotidine (PEPCID) 20 MG tablet, Sig Take 20 mg by mouth in the morning and 20 mg before bedtime., Start Date 6/27/22, End Date 6/22/23, Taking? Yes, Authorizing Provider Outside Provider    Medication enoxaparin (LOVENOX) 100 MG/ML injectable solution, Sig Expel excess and inject 0.9 mLs into the skin every 12 hours., Start Date 6/10/22, End Date , Taking? Yes, Authorizing Provider Abebe Mcleod MD    Medication aspirin 81 MG EC tablet, Sig Take 1 tablet by mouth daily., Start Date 6/10/22, End Date , Taking? Yes, Authorizing Provider Abebe Mcleod MD    Medication pentoxifylline (TRENtal) 400 MG CR tablet, Sig Take 1 tablet by mouth 3 times daily (with meals)., Start Date 6/10/22, End Date , Taking? Yes, Authorizing Provider Abebe Mcleod MD    Medication predniSONE (DELTASONE) 10 MG tablet, Sig Take 6 tablets by mouth daily., Start Date 6/10/22, End Date , Taking? Yes, Authorizing Provider Abebe Mcleod MD    Medication sulfamethoxazole-trimethoprim (BACTRIM SS) 400-80 MG per tablet, Sig Take 1 tablet by mouth daily., Start Date 6/10/22, End Date , Taking? Yes, Authorizing Provider Abebe Mcleod MD    Medication Multiple Vitamins-Minerals (vitamin - therapeutic multivitamins w/minerals) tablet, Sig Take 1 tablet by mouth daily., Start Date , End Date , Taking? Yes, Authorizing Provider Outside Provider    Medication lidocaine-prilocaine (EMLA) cream, Sig Apply to [facility] site 1 hour prior to access procedure, Start Date 12/21/21, End Date , Taking? Yes, Authorizing Provider YVONNE Phelps    Medication loratadine (CLARITIN) 10 MG tablet, Sig Take 10 mg by mouth daily. , Start Date , End Date , Taking? Yes, Authorizing Provider Outside Provider    Medication calcium carbonate (Calcium 600) 600 MG Tab, Sig Take 600 mg by mouth daily. , Start Date , End Date , Taking? Yes, Authorizing  Provider Outside Provider    Medication Cholecalciferol (Vitamin D3) 125 mcg (5,000 units) tablet, Sig Take 125 mcg by mouth daily., Start Date , End Date , Taking? Yes, Authorizing Provider Outside Provider    Medication acetaminophen (TYLENOL) 500 MG tablet, Sig Take 1,000 mg by mouth every 6 hours as needed for Pain. , Start Date , End Date , Taking? Yes, Authorizing Provider Outside Provider    Medication ascorbic acid (VITAMIN C) 500 MG tablet, Sig Take 500 mg by mouth daily., Start Date , End Date , Taking? Yes, Authorizing Provider Outside Provider    Medication Glucose Blood (BLOOD GLUCOSE TEST STRIPS) Strip, Sig Pharmacy-Please dispense to match brand of meter.. Indications: High Blood Sugar, Start Date 6/14/22, End Date , Taking? , Authorizing Provider Outside Provider    Medication Lancets (OneTouch Delica Plus [id number]) Misc, Sig , Start Date 6/14/22, End Date , Taking? , Authorizing Provider Outside Provider    Medication pantoprazole (PROTONIX) 40 MG tablet, Sig Take 1 tablet by mouth daily (before breakfast)., Start Date 5/17/22, End Date , Taking? , Authorizing Provider Will Walters MD         Patient Vitals in the past 24 hrs:  07/08/22 1011, BP:104/70, Temp:36.7 °C (98 °F), Temp src:Temporal, Pulse:(!) 106, Resp:20, SpO2:96 %  07/08/22 0748, BP:113/75, Temp:36.8 °C (98.2 °F), Temp src:Oral, Pulse:(!) 101, Resp:20, SpO2:94 %  07/08/22 0427, BP:113/76, Temp:36.8 °C (98.2 °F), Temp src:Oral, Pulse:99, Resp:16, SpO2:94 %  07/07/22 2305, BP:108/65, Temp:37 °C (98.6 °F), Temp src:Oral, Pulse:(!) 101, Resp:17, SpO2:96 %  07/07/22 2209, SpO2:94 %  07/07/22 1914, BP:114/74, Temp:37 °C (98.6 °F), Temp src:Oral, Pulse:(!) 114, Resp:18, SpO2:95 %  07/07/22 1500, BP:117/75, Temp:36.8 °C (98.2 °F), Temp src:Oral, Pulse:(!) 112, Resp:18, SpO2:94 %      Relevant Problems   No relevant active problems       Physical Exam     Airway   Mallampati: II  TM Distance: >3 FB  Neck ROM:  Full    Cardiovascular  Cardiovascular exam normal  Cardio Rhythm: Regular  Cardio Rate: Normal    Dental Exam  Dental exam normal    Pulmonary Exam  Pulmonary exam normal  Breath sounds clear to auscultation:  Yes      Anesthesia Plan:    ASA Status: 4  Anesthesia Type: General    Induction: Intravenous  Maintenance: Inhalational    Post-op Pain Management: Per Surgeon      Checklist  Reviewed: Lab Results, Patient Summary, Care Everywhere, Allergies, Past Med History, Medications, Nursing Notes, Consultations, Outside Records, NPO Status and Problem list  Consent/Risks Discussed Statement:  The proposed anesthetic plan, including its risks and benefits, have been discussed with the Patient along with the risks and benefits of alternatives. Questions were encouraged and answered and the patient and/or representative understands and agrees to proceed.        I discussed with the patient (and/or patient's legal representative) the risks and benefits of the proposed anesthesia plan, General, which may include services performed by other anesthesia providers.    Alternative anesthesia plans, if available, were reviewed with the patient (and/or patient's legal representative). Discussion has been held with the patient (and/or patient's legal representative) regarding risks of anesthesia, which include allergic reaction, sore throat, vomiting, nausea, depressed breathing and organ damage and emergent situations that may require change in anesthesia plan.    The patient (and/or patient's legal representative) has indicated understanding, his/her questions have been answered, and he/she wishes to proceed with the planned anesthetic.    Comments  Plan Comments: Obviously higher risk of pulmonary complications, including post op intubation, which patient understands and agrees to proceed     negative normal/cranial nerves II-XII intact/sensation intact

## 2022-09-26 NOTE — H&P PST ADULT - PROBLEM SELECTOR PLAN 1
Scheduled for resection of left back mass with Dr Verma and Dr Piedra on 10/12/2022.  COVID-19 testing information provided by surgeon.  Pre op instructions given and patient verbalized understanding.  CBC, BMP, EKG and medical clearance pending.  NPO after midnight night before procedure.  May take omeprazole, montelukast and zyrtec AM of procedure with small sip of water.  To stop all ASA, NSAIDs, vitamins and supplements 1 week prior to procedure.  Chlorhexidine wash given with instructions.  JANN precautions - HX JANN- OR booking aware   UCG am of procedure

## 2022-09-26 NOTE — H&P PST ADULT - NSICDXFAMILYHX_GEN_ALL_CORE_FT
FAMILY HISTORY:  Father  Still living? Unknown  FH: hyperlipidemia, Age at diagnosis: Age Unknown  FH: hypertension, Age at diagnosis: Age Unknown  FH: type 2 diabetes, Age at diagnosis: Age Unknown    Mother  Still living? Unknown  FH: cancer of GI tract, Age at diagnosis: Age Unknown  FH: hyperlipidemia, Age at diagnosis: Age Unknown  FH: hypertension, Age at diagnosis: Age Unknown  FH: type 2 diabetes, Age at diagnosis: Age Unknown    Sibling  Still living? Unknown  FH: leukemia, Age at diagnosis: Age Unknown  FH: uterine cancer, Age at diagnosis: Age Unknown

## 2022-09-26 NOTE — H&P PST ADULT - NSICDXPASTMEDICALHX_GEN_ALL_CORE_FT
PAST MEDICAL HISTORY:  GERD (gastroesophageal reflux disease)     Hyperlipidemia     Localized swelling, mass and lump, trunk     JANN on CPAP     PCOS (polycystic ovarian syndrome)     Seasonal allergies

## 2022-09-26 NOTE — H&P PST ADULT - HISTORY OF PRESENT ILLNESS
43 y/o female with PMH HLD, GERD, PCOD, seasonal allergies, and JANN - on CPAP nightly presents for PST.  C/o left back mass that has increased in size recently and recommended for surgical removal.  Denies any recent cough, fever or illness and is feeling well at PST today.  Scheduled for resection of left back mass with Dr Verma and Dr Piedra on 10/12/2022.  COVID-19 testing information provided by surgeon  43 y/o female with PMH HLD, GERD, PCOS, seasonal allergies, and JANN - on CPAP nightly presents for PST.  C/o left back mass that has increased in size recently and recommended for surgical removal.  Denies any recent cough, fever or illness and is feeling well at PST today.  Scheduled for resection of left back mass with Dr Verma and Dr Piedra on 10/12/2022.  COVID-19 testing information provided by surgeon

## 2022-09-26 NOTE — H&P PST ADULT - FALL HARM RISK - UNIVERSAL INTERVENTIONS
Bed in lowest position, wheels locked, appropriate side rails in place/Call bell, personal items and telephone in reach/Instruct patient to call for assistance before getting out of bed or chair/Non-slip footwear when patient is out of bed/[place] to call system/Physically safe environment - no spills, clutter or unnecessary equipment/Purposeful Proactive Rounding/Room/bathroom lighting operational, light cord in reach

## 2022-09-26 NOTE — H&P PST ADULT - ATTENDING COMMENTS
41-year-old lady with a soft tissue mass of the left upper back, slowly enlarging since 2018.  Now measures at least 8 cm in diameter.    She is scheduled for resection, with plastics closure (Dr Piedra).    Discussed with her prior to surgery, and again on day of operation.    All questions answered, consent on chart

## 2022-09-28 ENCOUNTER — LABORATORY RESULT (OUTPATIENT)
Age: 42
End: 2022-09-28

## 2022-10-10 ENCOUNTER — TRANSCRIPTION ENCOUNTER (OUTPATIENT)
Age: 42
End: 2022-10-10

## 2022-10-10 NOTE — ASU DISCHARGE PLAN (ADULT/PEDIATRIC) - DO NOT SUBMERGE DURATION DAY(S)
Keep dressing clean and dry for 2 days, ok to remove outer dressing and then leave Prineo intact. No tub baths please

## 2022-10-10 NOTE — ASU DISCHARGE PLAN (ADULT/PEDIATRIC) - CARE PROVIDER_API CALL
Will Piedra (MD)  Surgery  [address] [place] [id number]  Phone: [phone]  Fax: [phone]  Follow Up Time: 2 weeks

## 2022-10-10 NOTE — ASU DISCHARGE PLAN (ADULT/PEDIATRIC) - NS MD DC FALL RISK RISK
For information on Fall & Injury Prevention, visit: [email or web address] OR  [email or web address] OR  https://www.cdc.gov/steadi/patient.html

## 2022-10-10 NOTE — ASU DISCHARGE PLAN (ADULT/PEDIATRIC) - NURSING INSTRUCTIONS
All discharge information reviewed with patient including pain, safety, medication and follow up care . Pt acknowledges understanding of discharge instructions.  Make a foolow up appointment in 2 weeks All discharge information reviewed with patient including pain, safety, medication and follow up care . Pt acknowledges understanding of discharge instructions.  Make a follow  up appointment in 2 weeks. Pt instructed on care of STERLING drain

## 2022-10-10 NOTE — ASU DISCHARGE PLAN (ADULT/PEDIATRIC) - ASU DC SPECIAL INSTRUCTIONSFT
No heavy lifting, strenuous activity or exercise for 2 weeks  Ok to remove outer dressing after 48hrs, then ok to shower and leave prineo dressing intact  Avoid NSAIDs and ASA for 48hrs after the procedure No heavy lifting, strenuous activity or exercise for 2 weeks  Ok to remove outer dressing after 48hrs, then ok to shower and leave prineo dressing intact  Avoid NSAIDs and ASA for 48hrs after the procedure.      Initial followup with plastic surgery in the next 5-15 days, regarding matters of bandages, activities, and showering.    Dr. Verma should call with pathology report in approximately 2 weeks.  The conversation will determine further management. No heavy lifting, strenuous activity or exercise for 2 weeks  Ok to remove outer dressing after 48hrs, then ok to shower and leave prineo dressing intact  Avoid NSAIDs and ASA for 48hrs after the procedure.  Please provide drain care instructions prior to d/c home    Initial followup with plastic surgery in the next 5-15 days, regarding matters of bandages, activities, and showering.    Dr. Verma should call with pathology report in approximately 2 weeks.  The conversation will determine further management.

## 2022-10-11 ENCOUNTER — TRANSCRIPTION ENCOUNTER (OUTPATIENT)
Age: 42
End: 2022-10-11

## 2022-10-11 RX ORDER — HEPARIN SODIUM 5000 [USP'U]/ML
5000 INJECTION INTRAVENOUS; SUBCUTANEOUS ONCE
Refills: 0 | Status: COMPLETED | OUTPATIENT
Start: 2022-10-12 | End: 2022-10-12

## 2022-10-12 ENCOUNTER — RESULT REVIEW (OUTPATIENT)
Age: 42
End: 2022-10-12

## 2022-10-12 ENCOUNTER — APPOINTMENT (OUTPATIENT)
Dept: SURGICAL ONCOLOGY | Facility: HOSPITAL | Age: 42
End: 2022-10-12

## 2022-10-12 ENCOUNTER — OUTPATIENT (OUTPATIENT)
Dept: OUTPATIENT SERVICES | Facility: HOSPITAL | Age: 42
LOS: 1 days | Discharge: HOME | End: 2022-10-12
Payer: COMMERCIAL

## 2022-10-12 ENCOUNTER — TRANSCRIPTION ENCOUNTER (OUTPATIENT)
Age: 42
End: 2022-10-12

## 2022-10-12 VITALS
SYSTOLIC BLOOD PRESSURE: 109 MMHG | RESPIRATION RATE: 16 BRPM | HEART RATE: 85 BPM | DIASTOLIC BLOOD PRESSURE: 65 MMHG | OXYGEN SATURATION: 96 %

## 2022-10-12 VITALS
RESPIRATION RATE: 15 BRPM | WEIGHT: 179.02 LBS | HEART RATE: 80 BPM | HEIGHT: 62 IN | OXYGEN SATURATION: 98 % | SYSTOLIC BLOOD PRESSURE: 118 MMHG | TEMPERATURE: 98 F | DIASTOLIC BLOOD PRESSURE: 83 MMHG

## 2022-10-12 DIAGNOSIS — Z01.818 ENCOUNTER FOR OTHER PREPROCEDURAL EXAMINATION: ICD-10-CM

## 2022-10-12 DIAGNOSIS — Z98.890 OTHER SPECIFIED POSTPROCEDURAL STATES: Chronic | ICD-10-CM

## 2022-10-12 DIAGNOSIS — R22.2 LOCALIZED SWELLING, MASS AND LUMP, TRUNK: ICD-10-CM

## 2022-10-12 PROCEDURE — 14302 TIS TRNFR ADDL 30 SQ CM: CPT | Mod: XP

## 2022-10-12 PROCEDURE — 21931 EXC BACK LES SC 3 CM/>: CPT

## 2022-10-12 PROCEDURE — 15733 MUSC MYOQ/FSCQ FLP H&N PEDCL: CPT | Mod: XP

## 2022-10-12 PROCEDURE — 21933 EXC BACK TUM DEEP 5 CM/>: CPT

## 2022-10-12 PROCEDURE — 14301 TIS TRNFR ANY 30.1-60 SQ CM: CPT | Mod: XP

## 2022-10-12 PROCEDURE — 15002 WOUND PREP TRK/ARM/LEG: CPT | Mod: XP

## 2022-10-12 PROCEDURE — C1889: CPT

## 2022-10-12 PROCEDURE — ZZZZZ: CPT

## 2022-10-12 PROCEDURE — 88305 TISSUE EXAM BY PATHOLOGIST: CPT | Mod: 26

## 2022-10-12 PROCEDURE — 88305 TISSUE EXAM BY PATHOLOGIST: CPT

## 2022-10-12 DEVICE — HEMOSTAT ARISTA 3GR: Type: IMPLANTABLE DEVICE | Status: FUNCTIONAL

## 2022-10-12 RX ORDER — OXYCODONE AND ACETAMINOPHEN 5; 325 MG/1; MG/1
2 TABLET ORAL ONCE
Refills: 0 | Status: DISCONTINUED | OUTPATIENT
Start: 2022-10-12 | End: 2022-10-12

## 2022-10-12 RX ORDER — OXYCODONE AND ACETAMINOPHEN 5; 325 MG/1; MG/1
1 TABLET ORAL ONCE
Refills: 0 | Status: DISCONTINUED | OUTPATIENT
Start: 2022-10-12 | End: 2022-10-12

## 2022-10-12 RX ORDER — SODIUM CHLORIDE 9 MG/ML
1000 INJECTION, SOLUTION INTRAVENOUS
Refills: 0 | Status: DISCONTINUED | OUTPATIENT
Start: 2022-10-12 | End: 2022-10-12

## 2022-10-12 RX ORDER — ONDANSETRON 8 MG/1
4 TABLET, FILM COATED ORAL ONCE
Refills: 0 | Status: DISCONTINUED | OUTPATIENT
Start: 2022-10-12 | End: 2022-10-12

## 2022-10-12 RX ORDER — HYDROMORPHONE HYDROCHLORIDE 2 MG/ML
0.5 INJECTION INTRAMUSCULAR; INTRAVENOUS; SUBCUTANEOUS ONCE
Refills: 0 | Status: DISCONTINUED | OUTPATIENT
Start: 2022-10-12 | End: 2022-10-12

## 2022-10-12 RX ADMIN — SODIUM CHLORIDE 50 MILLILITER(S): 9 INJECTION, SOLUTION INTRAVENOUS at 06:15

## 2022-10-12 RX ADMIN — HEPARIN SODIUM 5000 UNIT(S): 5000 INJECTION INTRAVENOUS; SUBCUTANEOUS at 06:33

## 2022-10-12 NOTE — ASU PATIENT PROFILE, ADULT - FALL HARM RISK - UNIVERSAL INTERVENTIONS
Ok for refill.  Prescription Drug Monitoring Program reviewed and no suspicious prescribing activity was noted.     Bed in lowest position, wheels locked, appropriate side rails in place/Call bell, personal items and telephone in reach/Instruct patient to call for assistance before getting out of bed or chair/Non-slip footwear when patient is out of bed/[place] to call system/Physically safe environment - no spills, clutter or unnecessary equipment/Purposeful Proactive Rounding/Room/bathroom lighting operational, light cord in reach

## 2022-10-19 LAB — SURGICAL PATHOLOGY STUDY: SIGNIFICANT CHANGE UP

## 2022-10-20 ENCOUNTER — APPOINTMENT (OUTPATIENT)
Dept: PEDIATRIC ALLERGY IMMUNOLOGY | Facility: CLINIC | Age: 42
End: 2022-10-20

## 2022-10-21 NOTE — ASSESSMENT
[FreeTextEntry1] : 41-year-old lady.\par \par Seen May 2019 with a soft tissue mass of the left upper back, slowly enlarging.\par \par I have suggested a repeat ultrasound.\par Prior imaging was at [facility].\par Prescription entered to have the study repeated presently.\par \par I have asked her to call me a week after the imaging to discuss the results.\par \par \par Reviewed in detail, all questions answered.\par \par \par Will likely require excision due to increase in size which is now likely >5 cm.\par Presented surgical technique.\par \par Would coordinate with plastic surgery due to the anticipated defect (Dr. FLORENTIN Piedra)...............\par \par \par 7/25/2022.\par We spoke.\par July 2, 2022, she had a sonogram of her back.\par The palpable abnormality corresponds to an 8 cm subcutaneous hypoechoic mass, consistent with a lipoma.\par I suggested based on its size that she consider resection, which is her preference.\par She would like to wait until October, which is reasonable.\par Paperwork for scheduling submitted.\par \par \par 10/21/2022.\par We spoke.\par October 12, 2022, she had resection of an enlarging soft tissue mass of the left upper back.\par Plastics: Dr. FLORENTIN Piedra.\par Surgical pathology: Lipoma measuring 7.5 x 6.1 x 4.5 cm.\par Clinically this was completely resected.\par Presently, no further intervention is warranted.\par \par Note dictated to her referring physicians.\par I have asked to see her in the spring.

## 2022-10-21 NOTE — PHYSICAL EXAM
[Normal] : supple, no neck mass and thyroid not enlarged [Normal Neck Lymph Nodes] : normal neck lymph nodes  [Normal Supraclavicular Lymph Nodes] : normal supraclavicular lymph nodes [Normal Axillary Lymph Nodes] : normal axillary lymph nodes [Normal] : full range of motion and no deformities appreciated [de-identified] : Below [de-identified] : groins not examined

## 2022-10-21 NOTE — REASON FOR VISIT
Yes [Other: _____] : [unfilled] [FreeTextEntry2] : Reestablish follow-up for a soft tissue mass of the left upper back.

## 2022-10-21 NOTE — HISTORY OF PRESENT ILLNESS
[de-identified] : 41-year-old lady.\par \par Only seen once previously.\par \par May 2019:\par Referred by plastic surgery (Dr. Reuben WRIGHT).\par \par +1-year history of a slowly enlarging asymptomatic mass of the left upper back.\par \par My PE:\par ~5 x 3 cm oval, smooth, mobile, nontender mass of the left upper back.\par \par May 2019:\par Left back sonogram at [facility]:\par Incomplete characterization of a 4 cm soft tissue mass.\par \par June 2019:\par Chest MRI at [facility]:\par 4.5 x 4.5 x 1.7 cm encapsulated soft tissue mass consistent with a lipoma.\par \par Initially she expressed an interest in resection.\par However, each time she was contacted by our office she deferred scheduling.\par \par \par CC:\par The area feels larger to her.\par It remains asymptomatic.\par \par \par No other lumps or bumps presently.\par \par No personal history of malignancy.\par \par The only relative with a history of cancers her paternal aunt with a stomach malignancy.\par \par January 2019 she had an episode of HEMOPTYSIS\par She went to urgent care who transferred her to [facility].\par She was pregnant at that time, hospitalized, and monitored.\par An extensive evaluation is unremarkable, no source of her identified.\par Problem was self-limited, and has not recurred.\par \par \par Her internist is Dr. Brian MARQUEZ.\par \par No pacemaker or defibrillator.\par No anticoagulants.\par \par + Hypercholesterolemia.\par She takes  Atorvastatin.\par She has not had to see a cardiologist.\par \par + Pantoprazole for heartburn, no upper endoscopy yet.\par \par + Environmental allergies.\par Symptoms are treated with montelukast and Zyrtec.\par \par + Metformin for PCOS (below)\par \par + Gestational diabetes.\par \par \par Her gynecologist is Dr. Marianna NEVAREZ\par +hx of PCOS.\par + Gestational diabetes\par She remains on metformin not for control of her glucose, but because of a beneficial effect for her PCOS.\par \par \par Colonoscopy will be between age 47 and 52

## 2022-12-20 ENCOUNTER — APPOINTMENT (OUTPATIENT)
Dept: UROGYNECOLOGY | Facility: CLINIC | Age: 42
End: 2022-12-20

## 2022-12-20 VITALS
WEIGHT: 178 LBS | HEART RATE: 93 BPM | HEIGHT: 62 IN | SYSTOLIC BLOOD PRESSURE: 126 MMHG | BODY MASS INDEX: 32.76 KG/M2 | DIASTOLIC BLOOD PRESSURE: 87 MMHG

## 2022-12-20 PROBLEM — R22.2 LOCALIZED SWELLING, MASS AND LUMP, TRUNK: Chronic | Status: ACTIVE | Noted: 2022-09-26

## 2022-12-20 PROBLEM — E28.2 POLYCYSTIC OVARIAN SYNDROME: Chronic | Status: ACTIVE | Noted: 2022-09-26

## 2022-12-20 PROBLEM — G47.33 OBSTRUCTIVE SLEEP APNEA (ADULT) (PEDIATRIC): Chronic | Status: ACTIVE | Noted: 2022-09-26

## 2022-12-20 PROBLEM — J30.2 OTHER SEASONAL ALLERGIC RHINITIS: Chronic | Status: ACTIVE | Noted: 2022-09-26

## 2022-12-20 PROBLEM — K21.9 GASTRO-ESOPHAGEAL REFLUX DISEASE WITHOUT ESOPHAGITIS: Chronic | Status: ACTIVE | Noted: 2022-09-26

## 2022-12-20 PROBLEM — E78.5 HYPERLIPIDEMIA, UNSPECIFIED: Chronic | Status: ACTIVE | Noted: 2022-09-26

## 2022-12-20 LAB
BILIRUB UR QL STRIP: NORMAL
CLARITY UR: CLEAR
COLLECTION METHOD: NORMAL
GLUCOSE UR-MCNC: NORMAL
HCG UR QL: 1 EU/DL
HGB UR QL STRIP.AUTO: NORMAL
KETONES UR-MCNC: NORMAL
LEUKOCYTE ESTERASE UR QL STRIP: NORMAL
NITRITE UR QL STRIP: NORMAL
PH UR STRIP: 6
PROT UR STRIP-MCNC: NORMAL
SP GR UR STRIP: 1.02

## 2022-12-20 PROCEDURE — 99204 OFFICE O/P NEW MOD 45 MIN: CPT | Mod: 25

## 2022-12-20 PROCEDURE — 51701 INSERT BLADDER CATHETER: CPT

## 2022-12-20 NOTE — REASON FOR VISIT
[Questionnaire Received] : Patient questionnaire received [Intake Form Reviewed] : Patient intake form with past medical history, surgical history, family history and social history reviewed today [Urinary Incontinence] : urinary incontinence [Pelvic Organ Prolapse] : pelvic organ prolapse

## 2022-12-21 DIAGNOSIS — Z86.69 PERSONAL HISTORY OF OTHER DISEASES OF THE NERVOUS SYSTEM AND SENSE ORGANS: ICD-10-CM

## 2022-12-21 DIAGNOSIS — Z82.5 FAMILY HISTORY OF ASTHMA AND OTHER CHRONIC LOWER RESPIRATORY DISEASES: ICD-10-CM

## 2022-12-21 DIAGNOSIS — Z86.39 PERSONAL HISTORY OF OTHER ENDOCRINE, NUTRITIONAL AND METABOLIC DISEASE: ICD-10-CM

## 2022-12-21 DIAGNOSIS — Z83.3 FAMILY HISTORY OF DIABETES MELLITUS: ICD-10-CM

## 2022-12-21 DIAGNOSIS — Z82.3 FAMILY HISTORY OF STROKE: ICD-10-CM

## 2022-12-21 DIAGNOSIS — Z78.9 OTHER SPECIFIED HEALTH STATUS: ICD-10-CM

## 2022-12-21 DIAGNOSIS — Z83.42 FAMILY HISTORY OF FAMILIAL HYPERCHOLESTEROLEMIA: ICD-10-CM

## 2022-12-23 LAB — BACTERIA UR CULT: NORMAL

## 2022-12-27 LAB
APPEARANCE: CLEAR
BACTERIA: NEGATIVE
BILIRUBIN URINE: NEGATIVE
BLOOD URINE: ABNORMAL
COLOR: NORMAL
GLUCOSE QUALITATIVE U: NEGATIVE
HYALINE CASTS: 0 /LPF
KETONES URINE: NEGATIVE
LEUKOCYTE ESTERASE URINE: NEGATIVE
MICROSCOPIC-UA: NORMAL
NITRITE URINE: NEGATIVE
PH URINE: 6
PROTEIN URINE: NEGATIVE
RED BLOOD CELLS URINE: 4 /HPF
SPECIFIC GRAVITY URINE: >=1.03
SQUAMOUS EPITHELIAL CELLS: 5 /HPF
UROBILINOGEN URINE: NORMAL
WHITE BLOOD CELLS URINE: 2 /HPF

## 2023-01-12 ENCOUNTER — APPOINTMENT (OUTPATIENT)
Dept: UROGYNECOLOGY | Facility: CLINIC | Age: 43
End: 2023-01-12
Payer: COMMERCIAL

## 2023-01-12 ENCOUNTER — APPOINTMENT (OUTPATIENT)
Dept: UROGYNECOLOGY | Facility: CLINIC | Age: 43
End: 2023-01-12

## 2023-01-12 PROCEDURE — 51725 SIMPLE CYSTOMETROGRAM: CPT

## 2023-01-19 ENCOUNTER — APPOINTMENT (OUTPATIENT)
Dept: UROGYNECOLOGY | Facility: CLINIC | Age: 43
End: 2023-01-19
Payer: COMMERCIAL

## 2023-01-19 DIAGNOSIS — R31.29 OTHER MICROSCOPIC HEMATURIA: ICD-10-CM

## 2023-01-19 PROCEDURE — 99214 OFFICE O/P EST MOD 30 MIN: CPT | Mod: 25

## 2023-01-19 PROCEDURE — 81003 URINALYSIS AUTO W/O SCOPE: CPT | Mod: QW

## 2023-01-19 PROCEDURE — 51701 INSERT BLADDER CATHETER: CPT

## 2023-01-19 NOTE — PHYSICAL EXAM
[Chaperone Present] : A chaperone was present in the examining room during all aspects of the physical examination [Labia Majora] : were normal [Normal Appearance] : general appearance was normal [Normal] : no abnormalities [Scar] : a scar was noted [Aa ____] : Aa [unfilled] [Ba ____] : Ba [unfilled] [C ____] : C [unfilled] [GH ____] : GH [unfilled] [PB ____] : PB [unfilled] [TVL ____] : TVL  [unfilled] [Ap ____] : Ap [unfilled] [Bp ____] : Bp [unfilled] [D ____] : D [unfilled] [Normal rectal exam] : was normal [FreeTextEntry1] : General: Well, appearing. Alert and orientated. No acute distress\par HEENT: Normocephalic, atraumatic and extraocular muscles appear to be intact \par Neck: Full range of motion, no obvious lymphadenopathy, deformities, or masses noted \par Respiratory: Speaking in full sentences comfortably, normal work of breathing and no cough during visit\par Musculoskeletal: active full range of motion in extremities \par Extremities: No upper extremity edema noted\par Skin: no obvious rash or skin lesions\par Neuro: Orientated X 3, speech is fluent, normal rate\par Psych: Normal mood and affect \par   [Tenderness] : ~T no ~M abdominal tenderness observed [Distended] : not distended [de-identified] : no sphincter defects palpated

## 2023-01-19 NOTE — DISCUSSION/SUMMARY
[FreeTextEntry1] : \mike Mckinney presents with symptoms of DWIGHT and has a stage 2 rectocele which is symptomatic. We reviewed management options for her prolapse including: observation, pelvic floor exercises with and without PT, pessary, and surgical management. She desires surgery with a posterior repair and midurethral sling. I recommend preop wokrup with urodynamic testing. IUGA info on DWIGHT and posterior repairs provided to her. IUGA info on URD also provided.  She would like to proceed with surgical scheduling and will RTO for preop counseling.

## 2023-01-19 NOTE — HISTORY OF PRESENT ILLNESS
[Rectal Prolapse] : moderate [] : years ago [Unable To Restrain Bowel Movement] : mild [Feelings Of Urinary Urgency] : no [Urinary Frequency More Than Twice At Night (Nocturia)] : no nocturia [Urinary Tract Infection] : mild [Stool Visible Blood] : no [Incomplete Emptying Of Stool] : no [de-identified] : rare, not bothersome [FreeTextEntry1] : \par PMH: PCOS (on metformin)\par PSH: abdominoplasty\par No longer desires future childbearing, has IUD

## 2023-01-20 LAB
APPEARANCE: CLEAR
BACTERIA: NEGATIVE
BILIRUBIN URINE: NEGATIVE
BLOOD URINE: NEGATIVE
COLOR: NORMAL
GLUCOSE QUALITATIVE U: NEGATIVE
HYALINE CASTS: 0 /LPF
KETONES URINE: NEGATIVE
LEUKOCYTE ESTERASE URINE: NEGATIVE
MICROSCOPIC-UA: NORMAL
NITRITE URINE: NEGATIVE
PH URINE: 7
PROTEIN URINE: NORMAL
RED BLOOD CELLS URINE: 2 /HPF
SPECIFIC GRAVITY URINE: 1.01
SQUAMOUS EPITHELIAL CELLS: 1 /HPF
UROBILINOGEN URINE: NORMAL
WHITE BLOOD CELLS URINE: 1 /HPF

## 2023-01-23 NOTE — DISCUSSION/SUMMARY
[FreeTextEntry1] : Microhematuria:\par -UA sent to lab for evaluation\par -Will f/u with results\par \par Advised pt to call the office with any questions or concerns.\par \par

## 2023-01-23 NOTE — HISTORY OF PRESENT ILLNESS
[FreeTextEntry1] : Faye is a 43 y/o with DWIGHT and rectocele that presents to the office for a follow up. She is here for UCA for a repeat UA due to trace blood from 12/27/22. She has no acute complaints today.

## 2023-01-23 NOTE — PROCEDURE
[Straight Catheterization] : insertion of a straight catheter [Other ___] : [unfilled] [Patient] : the patient [Allergies Reviewed] : Allergies reviewed [___ Fr Straight Tip] : a [unfilled] in Afghan straight tip catheter [None] : there were no complications with the catheter insertion [Clear] : clear [Urinalysis] : urinalysis [No Complications] : no complications [Tolerated Well] : the patient tolerated the procedure well [Post procedure instructions and information given] : Post procedure instructions and information were given and reviewed with patient. [FreeTextEntry1] : Obtained urine via straight cath because unable to get clean catch due to vaginal atrophy

## 2023-01-23 NOTE — PHYSICAL EXAM
[No Acute Distress] : in no acute distress [Well developed] : well developed [Well Nourished] : ~L well nourished [Good Hygeine] : demonstrates good hygeine [Oriented x3] : oriented to person, place, and time [Normal Memory] : ~T memory was ~L unimpaired [Normal Mood/Affect] : mood and affect are normal [None] : no CVA tenderness [Warm and Dry] : was warm and dry to touch [Normal Gait] : gait was normal [Labia Majora] : were normal [Labia Minora] : were normal [Normal] : was normal [Normal Appearance] : general appearance was normal [Rectocele] : a rectocele [No Bleeding] : there was no active vaginal bleeding [Anxiety] : patient is not anxious [Tenderness] : ~T no ~M abdominal tenderness observed [Distended] : not distended

## 2023-02-23 ENCOUNTER — NON-APPOINTMENT (OUTPATIENT)
Age: 43
End: 2023-02-23

## 2023-03-02 ENCOUNTER — OUTPATIENT (OUTPATIENT)
Dept: OUTPATIENT SERVICES | Facility: HOSPITAL | Age: 43
LOS: 1 days | End: 2023-03-02
Payer: COMMERCIAL

## 2023-03-02 ENCOUNTER — APPOINTMENT (OUTPATIENT)
Dept: ULTRASOUND IMAGING | Facility: CLINIC | Age: 43
End: 2023-03-02
Payer: COMMERCIAL

## 2023-03-02 DIAGNOSIS — Z98.890 OTHER SPECIFIED POSTPROCEDURAL STATES: Chronic | ICD-10-CM

## 2023-03-02 DIAGNOSIS — Z00.8 ENCOUNTER FOR OTHER GENERAL EXAMINATION: ICD-10-CM

## 2023-03-02 PROCEDURE — 76830 TRANSVAGINAL US NON-OB: CPT | Mod: 26

## 2023-03-02 PROCEDURE — 76856 US EXAM PELVIC COMPLETE: CPT

## 2023-03-02 PROCEDURE — 76830 TRANSVAGINAL US NON-OB: CPT

## 2023-03-02 PROCEDURE — 76856 US EXAM PELVIC COMPLETE: CPT | Mod: 26

## 2023-03-15 ENCOUNTER — APPOINTMENT (OUTPATIENT)
Dept: UROGYNECOLOGY | Facility: CLINIC | Age: 43
End: 2023-03-15
Payer: COMMERCIAL

## 2023-03-15 VITALS
HEIGHT: 62 IN | SYSTOLIC BLOOD PRESSURE: 120 MMHG | WEIGHT: 178 LBS | DIASTOLIC BLOOD PRESSURE: 80 MMHG | BODY MASS INDEX: 32.76 KG/M2

## 2023-03-15 DIAGNOSIS — N39.41 URGE INCONTINENCE: ICD-10-CM

## 2023-03-15 DIAGNOSIS — N39.3 STRESS INCONTINENCE (FEMALE) (MALE): ICD-10-CM

## 2023-03-15 DIAGNOSIS — N81.6 RECTOCELE: ICD-10-CM

## 2023-03-15 PROCEDURE — 99214 OFFICE O/P EST MOD 30 MIN: CPT | Mod: 25

## 2023-03-15 PROCEDURE — 51701 INSERT BLADDER CATHETER: CPT

## 2023-03-15 NOTE — DISCUSSION/SUMMARY
[FreeTextEntry1] : \mike Mckinney is a 41 yo who presents today for follow up on prolapse and urinary incontinence. We discussed options for her prolapse including nonsurgical and surgical options and she desires to proceed with surgical option posterior repair. We discussed options for her stress urinary incontinence including expectant management, surgical options and nonsurgical options. She continues to desire surgical management. We discussed surgical management options including a midurethral sling, [place] colposuspension and pubovaginal sling using native tissue. She desires a midurethral sling with mesh.\par \par We reviewed risks to the procedure including, but not limited to: bleeding, infection, pain, urinary retention requiring an indwelling catheter, recurrence of stress incontinence or prolapse, failure of procedure to alleviate symptoms, worsening of overactive bladder or urge incontinence, voiding dysfunction, dyspareunia. We discussed that this surgery is not intended to fix her urge incontinence/urinary urgency/frequency and this could worsen/stay the same. We discussed that if she does get pregnant in the future; she might have recurrence of her stress incontinence or prolapse requiring surgery again the future if she desires.  We also extensively reviewed the risk of injury to the bladder, ureters, urethra, vagina, rectum, and bowel. We also discussed the risk of sling mesh exposure, fistula formation, neuropathy, erosion, pain, and need for reoperation. Risks were explained in layman's terms. She expressed understanding of these risks and continues to desire the planned surgical procedure. We reviewed her hospital stay as well as preoperative and postoperative instructions. IUGA on MUS and posterior repair were given to patient, again, per pt request. \par \par Patient signed consent for: pelvic exam under anesthesia, posterior repair, midurethral sling with mesh, cystoscopy, possible anterior repair. Pt understands that pelvic exam under anesthesia can be performed by learners (medical students/residents/fellows).\par \par Patient will be seeing her PCP prior to surgery for clearance. \par

## 2023-03-15 NOTE — REASON FOR VISIT
[Follow-Up Visit_____] : a follow-up visit for [unfilled] [Pelvic Organ Prolapse] : pelvic organ prolapse [Urinary Incontinence] : urinary incontinence

## 2023-03-15 NOTE — PHYSICAL EXAM
[Chaperone Present] : A chaperone was present in the examining room during all aspects of the physical examination [Labia Majora] : were normal [Labia Minora] : were normal [Normal Appearance] : general appearance was normal [Aa ____] : Aa [unfilled] [Ba ____] : Ba [unfilled] [C ____] : C [unfilled] [GH ____] : GH [unfilled] [PB ____] : PB [unfilled] [TVL ____] : TVL  [unfilled] [Ap ____] : Ap [unfilled] [Bp ____] : Bp [unfilled] [D ____] : D [unfilled] [IUD String] : had an IUD string protruding out [Normal] : no abnormalities [Exam Deferred] : was deferred [FreeTextEntry1] : General: Well, appearing. Alert and orientated. No acute distress\par HEENT: Normocephalic, atraumatic and extraocular muscles appear to be intact \par Neck: Full range of motion, no obvious lymphadenopathy, deformities, or masses noted \par Respiratory: Speaking in full sentences comfortably, normal work of breathing and no cough during visit\par Musculoskeletal: active full range of motion in extremities \par Extremities: No upper extremity edema noted\par Skin: no obvious rash or skin lesions\par Neuro: Orientated X 3, speech is fluent, normal rate\par Psych: Normal mood and affect\par

## 2023-03-15 NOTE — HISTORY OF PRESENT ILLNESS
[FreeTextEntry1] : \par Faye is a 43 yo who presents today for follow up on prolapse and urinary incontinence. \par \par CMG 01/2023: intermediate 300mL. +CST. \par \par PSHx: abdominoplasty, mastopexy\par PMHx: PCOS (on metformin) \par No longer desires future childbearing, has IUD \par \par

## 2023-03-15 NOTE — PROCEDURE
[Straight Catheterization] : insertion of a straight catheter [Urinary Tract Infection] : a urinary tract infection [Patient] : the patient [Consent Obtained] : written consent was obtained prior to the procedure and is detailed in the patient's record [Allergies Reviewed] : Allergies reviewed [___ Fr Straight Tip] : a [unfilled] in Angolan straight tip catheter [None] : there were no complications with the catheter insertion [Clear] : clear [Culture] : culture [No Complications] : no complications [Tolerated Well] : the patient tolerated the procedure well

## 2023-03-17 ENCOUNTER — OUTPATIENT (OUTPATIENT)
Dept: OUTPATIENT SERVICES | Facility: HOSPITAL | Age: 43
LOS: 1 days | End: 2023-03-17
Payer: COMMERCIAL

## 2023-03-17 VITALS
OXYGEN SATURATION: 98 % | HEART RATE: 82 BPM | RESPIRATION RATE: 14 BRPM | HEIGHT: 62 IN | DIASTOLIC BLOOD PRESSURE: 79 MMHG | SYSTOLIC BLOOD PRESSURE: 112 MMHG | WEIGHT: 175.93 LBS | TEMPERATURE: 98 F

## 2023-03-17 DIAGNOSIS — N81.6 RECTOCELE: ICD-10-CM

## 2023-03-17 DIAGNOSIS — N39.3 STRESS INCONTINENCE (FEMALE) (MALE): ICD-10-CM

## 2023-03-17 DIAGNOSIS — C85.92 NON-HODGKIN LYMPHOMA, UNSPECIFIED, INTRATHORACIC LYMPH NODES: Chronic | ICD-10-CM

## 2023-03-17 DIAGNOSIS — Z98.890 OTHER SPECIFIED POSTPROCEDURAL STATES: Chronic | ICD-10-CM

## 2023-03-17 DIAGNOSIS — Z01.818 ENCOUNTER FOR OTHER PREPROCEDURAL EXAMINATION: ICD-10-CM

## 2023-03-17 DIAGNOSIS — Z98.890 OTHER SPECIFIED POSTPROCEDURAL STATES: ICD-10-CM

## 2023-03-17 LAB
ANION GAP SERPL CALC-SCNC: 14 MMOL/L — SIGNIFICANT CHANGE UP (ref 5–17)
BACTERIA UR CULT: NORMAL
BUN SERPL-MCNC: 11 MG/DL — SIGNIFICANT CHANGE UP (ref 7–23)
CALCIUM SERPL-MCNC: 9.4 MG/DL — SIGNIFICANT CHANGE UP (ref 8.4–10.5)
CHLORIDE SERPL-SCNC: 102 MMOL/L — SIGNIFICANT CHANGE UP (ref 96–108)
CO2 SERPL-SCNC: 24 MMOL/L — SIGNIFICANT CHANGE UP (ref 22–31)
CREAT SERPL-MCNC: 0.41 MG/DL — LOW (ref 0.5–1.3)
EGFR: 127 ML/MIN/1.73M2 — SIGNIFICANT CHANGE UP
GLUCOSE SERPL-MCNC: 95 MG/DL — SIGNIFICANT CHANGE UP (ref 70–99)
HCT VFR BLD CALC: 43.7 % — SIGNIFICANT CHANGE UP (ref 34.5–45)
HGB BLD-MCNC: 13.6 G/DL — SIGNIFICANT CHANGE UP (ref 11.5–15.5)
MCHC RBC-ENTMCNC: 26.7 PG — LOW (ref 27–34)
MCHC RBC-ENTMCNC: 31.1 GM/DL — LOW (ref 32–36)
MCV RBC AUTO: 85.9 FL — SIGNIFICANT CHANGE UP (ref 80–100)
NRBC # BLD: 0 /100 WBCS — SIGNIFICANT CHANGE UP (ref 0–0)
PLATELET # BLD AUTO: 275 K/UL — SIGNIFICANT CHANGE UP (ref 150–400)
POTASSIUM SERPL-MCNC: 4 MMOL/L — SIGNIFICANT CHANGE UP (ref 3.5–5.3)
POTASSIUM SERPL-SCNC: 4 MMOL/L — SIGNIFICANT CHANGE UP (ref 3.5–5.3)
RBC # BLD: 5.09 M/UL — SIGNIFICANT CHANGE UP (ref 3.8–5.2)
RBC # FLD: 13.2 % — SIGNIFICANT CHANGE UP (ref 10.3–14.5)
SODIUM SERPL-SCNC: 140 MMOL/L — SIGNIFICANT CHANGE UP (ref 135–145)
WBC # BLD: 6.65 K/UL — SIGNIFICANT CHANGE UP (ref 3.8–10.5)
WBC # FLD AUTO: 6.65 K/UL — SIGNIFICANT CHANGE UP (ref 3.8–10.5)

## 2023-03-17 PROCEDURE — G0463: CPT

## 2023-03-17 PROCEDURE — 85027 COMPLETE CBC AUTOMATED: CPT

## 2023-03-17 PROCEDURE — 80048 BASIC METABOLIC PNL TOTAL CA: CPT

## 2023-03-17 RX ORDER — METFORMIN HYDROCHLORIDE 850 MG/1
1 TABLET ORAL
Qty: 0 | Refills: 0 | DISCHARGE

## 2023-03-17 RX ORDER — SODIUM CHLORIDE 9 MG/ML
3 INJECTION INTRAMUSCULAR; INTRAVENOUS; SUBCUTANEOUS EVERY 8 HOURS
Refills: 0 | Status: DISCONTINUED | OUTPATIENT
Start: 2023-04-06 | End: 2023-04-20

## 2023-03-17 RX ORDER — SODIUM CHLORIDE 9 MG/ML
1000 INJECTION, SOLUTION INTRAVENOUS
Refills: 0 | Status: DISCONTINUED | OUTPATIENT
Start: 2023-04-06 | End: 2023-04-20

## 2023-03-17 NOTE — H&P PST ADULT - NSICDXPASTMEDICALHX_GEN_ALL_CORE_FT
PAST MEDICAL HISTORY:  GERD (gastroesophageal reflux disease)     Hyperlipidemia     Localized swelling, mass and lump, trunk     No pertinent past medical history     JANN on CPAP     PCOS (polycystic ovarian syndrome)     PCOS (polycystic ovarian syndrome)     Seasonal allergies

## 2023-03-17 NOTE — H&P PST ADULT - HISTORY OF PRESENT ILLNESS
43 y/o female with PMH HLD, GERD, PCOS, seasonal allergies, and JANN - on CPAP nightly presents for PST.  C/o stress incontinence and recommended for surgical repair of bladder.  Denies any recent cough, fever, n/v, abdominal pain, or illness and is feeling well at PST today.  Scheduled for posterior repair; cystoscopy; possible midurethral sling on 4/6/23 with Dr. Coleman.     Urine culture- 3/15/23 (in [facility])  Denies COVID in past 90 days 41 y/o female with PMH HLD, GERD, PCOS, seasonal allergies, and JANN - on CPAP nightly presents for PST.  C/o stress incontinence and recommended for surgical repair of bladder.  Denies any recent cough, fever, n/v, abdominal pain, or illness and is feeling well at PST today.  Scheduled for posterior repair; cystoscopy; possible midurethral sling on 4/6/23 with Dr. Coleman.     Medical Clearance with Dr. Quintero scheduled on 3/30/23- per surgeons request  Urine culture- 3/15/23 (in [facility])  Denies COVID in past 90 days

## 2023-03-17 NOTE — H&P PST ADULT - PROBLEM SELECTOR PLAN 1
-Scheduled for posterior repair; cystoscopy; possible midurethral sling on 4/6/23 with Dr. Coleman  -CBC, BMP today in PST  -Urine Culture 3/15/23 (in [facility])  -Preop instructions provided; Patient stated understanding using teach back method; a written copy also provided  -Adequate time provided for questions and answers   -Advised to take rosuvastatin, pantoprazole, zyrtec, and montelukast am DOS with sip of water  -Advised to stop metformin 24 hours prior to surgery (last dose evening of 4/4/23)  -LR, antibiotics, and urine pregnancy POCT ordered per protocol DOS -Scheduled for posterior repair; cystoscopy; possible midurethral sling on 4/6/23 with Dr. Coleman  -CBC, BMP today in PST  -Urine Culture 3/15/23 (in [facility])  -Medical Clearance with Dr. Quintero scheduled on 3/30/23- per surgeons request  -Preop instructions provided; Patient stated understanding using teach back method; a written copy also provided  -Adequate time provided for questions and answers   -Advised to take rosuvastatin, pantoprazole, zyrtec, and montelukast am DOS with sip of water  -Advised to stop metformin 24 hours prior to surgery (last dose evening of 4/4/23)  -LR, antibiotics, and urine pregnancy POCT ordered per protocol DOS

## 2023-03-17 NOTE — H&P PST ADULT - CARDIOVASCULAR COMMENTS
12/10/18 1019   Provider Notification   Reason for Communication Evaluate   Provider Name [facility] [place]   Provider Notification Physician   Method of Communication Face to face   Response At bedside   Notification Time 1020     Patient will be set up for a heart cath. hx VINOD

## 2023-03-17 NOTE — H&P PST ADULT - MUSCULOSKELETAL
negative normal/ROM intact/no joint swelling/no joint erythema/no joint warmth/normal gait/strength 5/5 bilateral upper extremities/strength 5/5 bilateral lower extremities

## 2023-03-17 NOTE — H&P PST ADULT - FUNCTIONAL STATUS
able to walk 1-2 miles; up 1-2 flights of stairs; moderate household tasks, active with 2 school aged boys/4-10 METS

## 2023-03-30 ENCOUNTER — APPOINTMENT (OUTPATIENT)
Dept: UROGYNECOLOGY | Facility: HOSPITAL | Age: 43
End: 2023-03-30

## 2023-04-05 ENCOUNTER — TRANSCRIPTION ENCOUNTER (OUTPATIENT)
Age: 43
End: 2023-04-05

## 2023-04-06 ENCOUNTER — TRANSCRIPTION ENCOUNTER (OUTPATIENT)
Age: 43
End: 2023-04-06

## 2023-04-06 ENCOUNTER — OUTPATIENT (OUTPATIENT)
Dept: OUTPATIENT SERVICES | Facility: HOSPITAL | Age: 43
LOS: 1 days | End: 2023-04-06
Payer: COMMERCIAL

## 2023-04-06 ENCOUNTER — APPOINTMENT (OUTPATIENT)
Dept: UROGYNECOLOGY | Facility: HOSPITAL | Age: 43
End: 2023-04-06
Payer: COMMERCIAL

## 2023-04-06 VITALS
OXYGEN SATURATION: 99 % | RESPIRATION RATE: 14 BRPM | SYSTOLIC BLOOD PRESSURE: 129 MMHG | HEART RATE: 101 BPM | TEMPERATURE: 98 F | DIASTOLIC BLOOD PRESSURE: 68 MMHG

## 2023-04-06 VITALS
SYSTOLIC BLOOD PRESSURE: 110 MMHG | DIASTOLIC BLOOD PRESSURE: 78 MMHG | TEMPERATURE: 97 F | OXYGEN SATURATION: 99 % | WEIGHT: 175.93 LBS | HEIGHT: 62 IN | RESPIRATION RATE: 16 BRPM | HEART RATE: 98 BPM

## 2023-04-06 DIAGNOSIS — Z98.890 OTHER SPECIFIED POSTPROCEDURAL STATES: Chronic | ICD-10-CM

## 2023-04-06 DIAGNOSIS — N81.6 RECTOCELE: ICD-10-CM

## 2023-04-06 DIAGNOSIS — C85.92 NON-HODGKIN LYMPHOMA, UNSPECIFIED, INTRATHORACIC LYMPH NODES: Chronic | ICD-10-CM

## 2023-04-06 DIAGNOSIS — N39.3 STRESS INCONTINENCE (FEMALE) (MALE): ICD-10-CM

## 2023-04-06 LAB
BLD GP AB SCN SERPL QL: NEGATIVE — SIGNIFICANT CHANGE UP
RH IG SCN BLD-IMP: POSITIVE — SIGNIFICANT CHANGE UP
RH IG SCN BLD-IMP: POSITIVE — SIGNIFICANT CHANGE UP

## 2023-04-06 PROCEDURE — 57260 CMBN ANT PST COLPRHY: CPT

## 2023-04-06 PROCEDURE — 57288 REPAIR BLADDER DEFECT: CPT

## 2023-04-06 PROCEDURE — C1771: CPT

## 2023-04-06 PROCEDURE — C9399: CPT

## 2023-04-06 PROCEDURE — 86900 BLOOD TYPING SEROLOGIC ABO: CPT

## 2023-04-06 PROCEDURE — 86850 RBC ANTIBODY SCREEN: CPT

## 2023-04-06 PROCEDURE — 86901 BLOOD TYPING SEROLOGIC RH(D): CPT

## 2023-04-06 DEVICE — SLING TRANSVAGINAL MID-URETHRAL ADVANTAGE FIT BLUE: Type: IMPLANTABLE DEVICE | Status: FUNCTIONAL

## 2023-04-06 RX ORDER — OXYCODONE HYDROCHLORIDE 5 MG/1
5 TABLET ORAL ONCE
Refills: 0 | Status: DISCONTINUED | OUTPATIENT
Start: 2023-04-06 | End: 2023-04-06

## 2023-04-06 RX ORDER — MONTELUKAST 4 MG/1
1 TABLET, CHEWABLE ORAL
Qty: 0 | Refills: 0 | DISCHARGE

## 2023-04-06 RX ORDER — LIDOCAINE HCL 20 MG/ML
0.2 VIAL (ML) INJECTION ONCE
Refills: 0 | Status: COMPLETED | OUTPATIENT
Start: 2023-04-06 | End: 2023-04-06

## 2023-04-06 RX ORDER — ACETAMINOPHEN 500 MG
2 TABLET ORAL
Qty: 0 | Refills: 0 | DISCHARGE

## 2023-04-06 RX ORDER — HYDROMORPHONE HYDROCHLORIDE 2 MG/ML
0.25 INJECTION INTRAMUSCULAR; INTRAVENOUS; SUBCUTANEOUS
Refills: 0 | Status: DISCONTINUED | OUTPATIENT
Start: 2023-04-06 | End: 2023-04-06

## 2023-04-06 RX ORDER — CETIRIZINE HYDROCHLORIDE 10 MG/1
1 TABLET ORAL
Qty: 0 | Refills: 0 | DISCHARGE

## 2023-04-06 RX ORDER — CEFAZOLIN SODIUM 1 G
2000 VIAL (EA) INJECTION ONCE
Refills: 0 | Status: COMPLETED | OUTPATIENT
Start: 2023-04-06 | End: 2023-04-06

## 2023-04-06 RX ORDER — PANTOPRAZOLE SODIUM 20 MG/1
1 TABLET, DELAYED RELEASE ORAL
Qty: 0 | Refills: 0 | DISCHARGE

## 2023-04-06 RX ORDER — IBUPROFEN 200 MG
1 TABLET ORAL
Qty: 45 | Refills: 0
Start: 2023-04-06 | End: 2023-04-20

## 2023-04-06 RX ORDER — MULTIVIT-MIN/FERROUS GLUCONATE 9 MG/15 ML
1 LIQUID (ML) ORAL
Qty: 0 | Refills: 0 | DISCHARGE

## 2023-04-06 RX ORDER — APREPITANT 80 MG/1
40 CAPSULE ORAL ONCE
Refills: 0 | Status: COMPLETED | OUTPATIENT
Start: 2023-04-06 | End: 2023-04-06

## 2023-04-06 RX ORDER — METFORMIN HYDROCHLORIDE 850 MG/1
2 TABLET ORAL
Qty: 0 | Refills: 0 | DISCHARGE

## 2023-04-06 RX ORDER — ONDANSETRON 8 MG/1
4 TABLET, FILM COATED ORAL ONCE
Refills: 0 | Status: COMPLETED | OUTPATIENT
Start: 2023-04-06 | End: 2023-04-06

## 2023-04-06 RX ORDER — SODIUM CHLORIDE 9 MG/ML
1000 INJECTION, SOLUTION INTRAVENOUS
Refills: 0 | Status: DISCONTINUED | OUTPATIENT
Start: 2023-04-06 | End: 2023-04-20

## 2023-04-06 RX ORDER — ROSUVASTATIN CALCIUM 5 MG/1
1 TABLET ORAL
Qty: 0 | Refills: 0 | DISCHARGE

## 2023-04-06 RX ORDER — DIAZEPAM 5 MG
1 TABLET ORAL
Qty: 10 | Refills: 0
Start: 2023-04-06

## 2023-04-06 RX ADMIN — SODIUM CHLORIDE 3 MILLILITER(S): 9 INJECTION INTRAMUSCULAR; INTRAVENOUS; SUBCUTANEOUS at 09:08

## 2023-04-06 RX ADMIN — APREPITANT 40 MILLIGRAM(S): 80 CAPSULE ORAL at 09:19

## 2023-04-06 RX ADMIN — ONDANSETRON 4 MILLIGRAM(S): 8 TABLET, FILM COATED ORAL at 16:27

## 2023-04-06 RX ADMIN — SODIUM CHLORIDE 100 MILLILITER(S): 9 INJECTION, SOLUTION INTRAVENOUS at 09:20

## 2023-04-06 NOTE — BRIEF OPERATIVE NOTE - NSICDXBRIEFPREOP_GEN_ALL_CORE_FT
PRE-OP DIAGNOSIS:  Cystocele with rectocele 06-Apr-2023 14:24:01  Hakan Greco  Urinary, incontinence, stress female 06-Apr-2023 14:24:10  Hakan Greco

## 2023-04-06 NOTE — ASU DISCHARGE PLAN (ADULT/PEDIATRIC) - ASU DC SPECIAL INSTRUCTIONSFT
Postoperative Instructions        Bowel Regimen  1. Use Miralax 1 cap full in 8 oz glass of water daily  2. If you are still unable to have a bowel movement, use Miralax in the evening time.   3. If still unable to have a bowel movement, then add in Milk of magnesium.     Pain control      For pain control, take the followin. Motrin 800mg every 8 hours as needed, take with food  2. Add Tylenol 975 four times a day, alternated with motrin  3. Add Valium 5mg tablet as needed for severe pain not managed well by motrin and tylenol. A prescription has been sent to your pharmacy on file.         Incision Care  Keep your incision(s) clean and dry. It is ok to shower, but do not scrub the incision sites--just allow the water to gently wash over your skin.     Postoperative restrictions    Do not drive or make important decisions for 24 hours after anesthesia. You may feel drowsy for 24 hours and should have a responsible adult with you during that time. Nothing in the vagina (tampons, sexual intercourse), No tub baths, pools or hot tubs for 6 weeks (showers are ok!). No lifting anything heavier than 15 lbs, no strenuous exercise for 6 weeks after surgery. Do not pull or cut any stitches that you see around your incision.            Vaginal bleeding    Spotting and intermittent passage of blood clots per vagina is normal in first few weeks after surgery. If you are soaking 1 pad per hour, that is not normal and you should notify Dr. Coleman's office and seek medical attention right away.        Vaginal discharge    Vaginal discharge (all colors) is normal after vaginal surgery. If you’ve had vaginal surgery, you have sutures in your vagina which take 3 months to fully absorb. You may have vaginal discharge during this time. This is normal.      Signs of Infection  Some postoperative pain and discomfort is normal. However, if you experience any of the following, you may be developing an infection and should be seen by your doctor: pain that does not get better with the oral medications listed above, fever greater than 100.4F, foul smelling discharge coming from the surgical site, nausea and vomiting that does not stop (especially if you are unable to tolerate oral intake), or inability to urinate. If you experience any of these symptoms, call your doctor's office to be seen right away.    Follow Up  Call Dr. Coleman's office to schedule a postoperative appointment in 2 weeks. The results from the procedure will be discussed with you at that time. Postoperative Instructions  ******************************************************************************************   Bowel Regimen: 1. Use Miralax 1 cap full in 8 oz glass of water daily 2. If you are still unable to have a bowel movement, use Miralax in the evening time. 3. If still unable to have a bowel movement, then add in Milk of magnesium.   ******************************************************************************************   Pain control: For pain control, take the followin. Motrin 800mg every 8 hours as needed, take with food 2. Add Tylenol 975 four times a day, alternated with motrin 3. Add Valium 5mg tablet as needed for severe pain not managed well by motrin and tylenol. A prescription has been sent to your pharmacy on file.   ******************************************************************************************   Incision Care  Keep your incision(s) clean and dry. It is ok to shower, but do not scrub the incision sites--just allow the water to gently wash over your skin.   ******************************************************************************************   Postoperative restrictions: Do not drive or make important decisions for 24 hours after anesthesia. You may feel drowsy for 24 hours and should have a responsible adult with you during that time. Nothing in the vagina (tampons, sexual intercourse), No tub baths, pools or hot tubs for 6 weeks (showers are ok!). No lifting anything heavier than 15 lbs, no strenuous exercise for 6 weeks after surgery. Do not pull or cut any stitches that you see around your incision.  ******************************************************************************************   Vaginal bleeding: Spotting and intermittent passage of blood clots per vagina is normal in first few weeks after surgery. If you are soaking 1 pad per hour, that is not normal and you should notify Dr. Coleman's office and seek medical attention right away.  ******************************************************************************************   Vaginal discharge: Vaginal discharge (all colors) is normal after vaginal surgery. If you’ve had vaginal surgery, you have sutures in your vagina which take 3 months to fully absorb. You may have vaginal discharge during this time. This is normal.  ******************************************************************************************   Signs of Infection: Some postoperative pain and discomfort is normal. However, if you experience any of the following, you may be developing an infection and should be seen by your doctor: pain that does not get better with the oral medications listed above, fever greater than 100.4F, foul smelling discharge coming from the surgical site, nausea and vomiting that does not stop (especially if you are unable to tolerate oral intake), or inability to urinate. If you experience any of these symptoms, call your doctor's office to be seen right away.  ******************************************************************************************   Follow Up: Call Dr. Coleman's office to schedule a postoperative appointment in 2 weeks. The results from the procedure will be discussed with you at that time.

## 2023-04-06 NOTE — ASU DISCHARGE PLAN (ADULT/PEDIATRIC) - CARE PROVIDER_API CALL
Radha Coleman (MD)  Female Pelvic MedReconst Surg; Obstetrics and Gynecology  [address], [address]  [place] [address]  Phone: [phone]  Fax: [phone]  Established Patient  Follow Up Time: 2 weeks

## 2023-04-06 NOTE — BRIEF OPERATIVE NOTE - NSICDXBRIEFPOSTOP_GEN_ALL_CORE_FT
POST-OP DIAGNOSIS:  Cystocele with rectocele 06-Apr-2023 14:24:30  Hakan Greco  Urinary, incontinence, stress female 06-Apr-2023 14:24:45  Hakan Greco

## 2023-04-06 NOTE — ASU DISCHARGE PLAN (ADULT/PEDIATRIC) - NURSING INSTRUCTIONS
OK to take Tylenol/Acetaminophen at 6PM TONIGHT 4/6 (last dose @  12PM   in operating room)  for pain and every 6 hours after as needed. OK to take Motrin/Ibuprofen at 6PM TONIGHT 4/6 (last dose @   12PM  in operating room)  for pain and every 6 hours after as needed. If taking both please take 2 hours apart (ex. Tylenol @ 6PM, Motrin @ 8pm)

## 2023-04-06 NOTE — ASU PREOP CHECKLIST - SKIN PREP
----- Message from Raya Haley sent at 10/3/2022  2:07 PM CDT -----  Patient daughter ( Kolby)called and stated that she took the patient to urgent care for a bed sore and they advised her to contact the office so wound care can be set up please give her a call at [phone]    
n/a

## 2023-04-06 NOTE — BRIEF OPERATIVE NOTE - OPERATION/FINDINGS
Cystocele and rectocele noted. Cystoscopy with nl bilateral ureteral orifices, no tumor, suture, foreign body or injury noted within the bladder.

## 2023-04-06 NOTE — BRIEF OPERATIVE NOTE - NSICDXBRIEFPROCEDURE_GEN_ALL_CORE_FT
PROCEDURES:  Repair of anterior wall of vagina 06-Apr-2023 14:23:32  Hakan Greco  Posterior vaginal repair 06-Apr-2023 14:23:36  Hakan Greco  Creation, sling, midurethral, with cystoscopy 06-Apr-2023 14:23:43  Hakan Greco

## 2023-04-06 NOTE — ASU DISCHARGE PLAN (ADULT/PEDIATRIC) - NS MD DC FALL RISK RISK
For information on Fall & Injury Prevention, visit: [email or web address] OR  [email or web address] OR  https://www.cdc.gov/steadi/patient.html

## 2023-04-06 NOTE — BRIEF OPERATIVE NOTE - CONDITION POST OP
Referring Provider:Solomon Ribeiro MD   PCP: Toya Sahni MD     Video Progress Note    Chief Complaint   Patient presents with   • Md Call   • Video Visit   • Shoulder Pain      History of Present Illness:  This is a 51 year old female with complaints of left shoulder pain.    This is a patient with chronic left shoulder pain since the beginning of 2019. She has been evaluated by Orthopedic surgery and diagnosed with a calcific tendinitis.  She underwent a left subacromial decompression and distal clavicle excision 9/13/19.  The patient has persistent pain in the left shoulder with significant reduction in range of motion.  She has obtained a 2nd and 3rd orthopedic opinion.  She is considering further surgery on the left shoulder with a rotator cuff repair.      Interval History:  Symptoms are stable.  Location of pain: not changed.  Left shoulder pain.  Starting to have some right shoulder pain as well, which she had years ago, possibly from over usage.  Radiation of pain: No   Numbness and tingling: No   Weakness: Yes, left shoulder with significant restriction of range of motion.  Rating of pain: 5-6/10.  Quality of pain: aching, dull, burning, sharp, stabbing, and stiffness.  Factors that worsen pain: movement and lying on left side.  Factors that alleviate pain: rest, ice, and massage.  The patient has tried rest, ice, PT, chiropractic, and massage.  Right handed female.    Pain is under good control with current medications.  She will wait on surgery as long as there is some improvement.  She is exercising at home.    She still has elevated WBC's.  Negative for Covid-19.  She was positive for Influenza A.  Flu symptoms are resolved.  Endocrinologist work up in progress.  Hematologist work-up in progress.  WBC is high, and has been high for a few years.  Checking for Cushing's disease.  Upper body and facial swelling.  [facility]note 3[facility] endocrinology department was inconclusive.  Buspirone was stopped because  of a possible side effect which may cause neck swelling, but her anxiety is slightly worse.  MRI of pituitary gland showed a microadenoma.  Monitoring blood sugars.  BP elevated, and on three antihypertensives now.    Pertinent surgeries: Yes  9/13/19:  Left subacromial decompression and distal clavicle excision    Pertinent pain procedures: No    Pertinent diagnostic studies: Yes.  X-rays show calcific tendinitis.   MRI shows calcific tendinitis.    Review of Systems   Constitutional: Positive for malaise/fatigue.   Gastrointestinal: Positive for constipation.   Musculoskeletal: Positive for joint pain, myalgias and neck pain.   Neurological: Positive for tingling, focal weakness and headaches.   Psychiatric/Behavioral: The patient is nervous/anxious and has insomnia.         Smoker  Obesity        Social History     Socioeconomic History   • Marital status: /Civil Union     Spouse name: Not on file   • Number of children: Not on file   • Years of education: Not on file   • Highest education level: Not on file   Occupational History   • Occupation: Unemployed   Social Needs   • Financial resource strain: Not on file   • Food insecurity     Worry: Not on file     Inability: Not on file   • Transportation needs     Medical: Not on file     Non-medical: Not on file   Tobacco Use   • Smoking status: Current Every Day Smoker     Packs/day: 0.25     Years: 25.00     Pack years: 6.25     Types: Cigarettes   • Smokeless tobacco: Never Used   Substance and Sexual Activity   • Alcohol use: Yes     Frequency: 2-4 times a month     Comment: occasional   • Drug use: No   • Sexual activity: Yes     Partners: Male     Birth control/protection: Surgical   Lifestyle   • Physical activity     Days per week: Not on file     Minutes per session: Not on file   • Stress: Not on file   Relationships   • Social connections     Talks on phone: Not on file     Gets together: Not on file     Attends Yazidi service: Not on file      Active member of club or organization: Not on file     Attends meetings of clubs or organizations: Not on file     Relationship status: Not on file   • Intimate partner violence     Fear of current or ex partner: Not on file     Emotionally abused: Not on file     Physically abused: Not on file     Forced sexual activity: Not on file   Other Topics Concern   • Not on file   Social History Narrative    She is  and lives with her  and 2 children ages 17 and 16 years old. Younger child her son is special needs.      The patient is .  Progeny: Yes, two children.  The patient works. Sales and marketing for clinical trials.    PHYSICAL EXAM:  Visit Vitals  LMP  (LMP Unknown)   General: Alert and Oriented to Person, Place, Time, appears stated age, no acute distress, cooperative and appropriately dressed and appropriately groomed. Obese.  Does not appear somnolent nor intoxicated.  Psychiatric: The patient has normal insight and judgment. Affect is normal. The patient's mood is normal, and appropriate for the circumstances. Memory is Intact.  HEENT: External appearance of ears appear normal. Hearing: able to hear normal volume speech.    Current controlled substances:  Norco 10/325 mg, 3/day (30 MME); she uses 2.5-3.5 tablets per day  Flexeril 5 mg, 1/night  Gabapentin 600 mg, 1/night; some excessive drowsiness on 2/day  Cymbalta 30 mg, 1/night; some excessive drowsiness on 2/day  Valium 5 mg, #14 tablets issued by her PCP 11/19/20; The patient was warned about the potential risk of respiratory depression and death when combining the use of opiates and benzodiazepines.     Previously tried controlled substances:  Stopped Tramadol 50 mg  Stopped Lorazepam 2 mg  Stopped Phentermine 30 mg  Stopped Buspirone    The patient has signed an appropriate pain management agreement.    Toxicology screening has been done.  2/18/20: Positive for cyclobenzaprine, Buspirone, and hydrocodone. Negative for duloxetine  and negative for Tramadol. Appropriate.    [place] PDMP reviewed; no aberrant behavior identified, prescription authorized.    A pill count has been done and shows an appropriate amount.    Opiate risk assessment tool:  SOAPP = 5; risk level is low - moderate.    PAIN CONTROL AND FUNCTIONAL ASSESSMENT:  BPI (Brief Pain Inventory)  AVERAGE PAIN LEVEL (Question 5 from BPI): N/A (PREVIOUS 5/10)  BPI FUNCTIONAL INTERFERENCE SCORE (Sum BPI Question 9): N/A (PREVIOUS 43)   BPI MOOD INTERFERENCE IN LIFE SCORE (BPI Question 9 B): N/A (PREVIOUS 3)    Evaluation for long-term opioid therapy:  Meeting functional goals? Yes  The fact that having opioids continually present in your blood stream increases your annualized risk of dying from a heart problem by 65% was discussed with the patient.  In light of this, did the patient want to continue their opioids?  Yes, 2/18/20  Has patient been prescribed naloxone for use at home if necessary (greater than or equal to 50 MME's or has respiratory compromise)?  No     On a scale from 0-10:  How bad has your average Pain been over the past week? 5-6/10  How much has it interfered with your Emotional state? 5/10  How much has it interfered with your General activity? 4/10    Signs of aberrant behavior are absent.    Risk of opiate abuse: low - moderate.    Discussion:   The patient is stable on her current pain medication regimen.  She is taking Norco 10/325 mg, 3 per day.  She is on gabapentin 600 mg, 1 per night, and Cymbalta 30 mg, 1 per night.  She had excessive drowsiness when Gabapentin or Cymbalta was at 2 per day.    The patient has some improvement on x-ray, and she was to have 2 months of physical therapy before re-evaluation by Orthopedic surgery. PT is on hold because of the Covid-19 pandemic. She exercises at home.    Buspirone was stopped by endocrinology because of a possible side effect which may cause neck swelling, but her anxiety is worsened. Consider  re-starting Buspirone, or consider a low-dose benzodiazepine, but she would not be able to use a benzodiazepine very frequently because she is on a chronic opiate. Valium 5 mg, issued by her PCP.    MRI of the pituitary shows a microadenoma.    Assessment:  1. Calcific tendonitis    2. Chronic left shoulder pain    3. Pain management contract agreement    4. Long term current use of opiate analgesic         Plan:  Orders Placed This Encounter   • HYDROcodone-acetaminophen (NORCO)  MG per tablet     Sig: Take 1 tablet by mouth 3 times daily as needed for Pain. Do not start before January 8, 2021.     Dispense:  90 tablet     Refill:  0     Order Specific Question:   Delegates - In compliance with state law, the Prescription Drug Monitoring Program must be reviewed prior to signing any order for a controlled substance.     Answer:   PDMP Reviewed by Delegate - No Unexpected Activity   • HYDROcodone-acetaminophen (NORCO)  MG per tablet     Sig: Take 1 tablet by mouth 3 times daily as needed for Pain.     Dispense:  90 tablet     Refill:  0     Order Specific Question:   Delegates - In compliance with state law, the Prescription Drug Monitoring Program must be reviewed prior to signing any order for a controlled substance.     Answer:   PDMP Reviewed by Delegate - No Unexpected Activity        Follow up: 2 months.    This visit was performed via live interactive two-way video.  During their visit, the patient was informed that their consent to treat includes permission to submit claims to their insurance on their behalf for the services received.  Clinician Location: [facility], [address]  Patient Location: Home     CC: Solomon Ribeiro MD  [address] [place] [address]    stable

## 2023-04-07 ENCOUNTER — NON-APPOINTMENT (OUTPATIENT)
Age: 43
End: 2023-04-07

## 2023-04-12 ENCOUNTER — NON-APPOINTMENT (OUTPATIENT)
Age: 43
End: 2023-04-12

## 2023-04-12 ENCOUNTER — APPOINTMENT (OUTPATIENT)
Dept: UROGYNECOLOGY | Facility: CLINIC | Age: 43
End: 2023-04-12
Payer: COMMERCIAL

## 2023-04-12 VITALS
BODY MASS INDEX: 32.76 KG/M2 | SYSTOLIC BLOOD PRESSURE: 124 MMHG | DIASTOLIC BLOOD PRESSURE: 74 MMHG | HEIGHT: 62 IN | WEIGHT: 178 LBS | HEART RATE: 86 BPM

## 2023-04-12 LAB
BILIRUB UR QL STRIP: NORMAL
CLARITY UR: CLEAR
COLLECTION METHOD: NORMAL
GLUCOSE UR-MCNC: NORMAL
HCG UR QL: 0.2 EU/DL
HGB UR QL STRIP.AUTO: NORMAL
KETONES UR-MCNC: NORMAL
LEUKOCYTE ESTERASE UR QL STRIP: NORMAL
NITRITE UR QL STRIP: NORMAL
PH UR STRIP: 8.5
PROT UR STRIP-MCNC: NORMAL
SP GR UR STRIP: 1.02

## 2023-04-12 PROCEDURE — 99024 POSTOP FOLLOW-UP VISIT: CPT

## 2023-04-12 NOTE — OBJECTIVE
[Voiding Trial] : No voiding trial was performed [Post Void Residual ____ ml] : Post Void Residual was [unfilled] ml [Soft and Nontender] : soft and nontender [Clean, Dry, Intact] : Clean, Dry, Intact [Good Support] : Good support [Healing well] : healing well [No Masses or Tenderness] : no masses or tenderness [FreeTextEntry3] : No mesh erosion or mesh tenderness. Short suture visible at the perineum; not able to cut as area is still healing

## 2023-04-12 NOTE — DISCUSSION/SUMMARY
[FreeTextEntry1] : Faye is a 41 yo  s/p APR, MUS on 4/6/2023. Discussed importance of taking regular pain medications around the clock for the pain. Advised against use of vaseline on the incision. Discussed importance of using stool softeners and postoperative restrictions. All questions answered. RTO in 1 week as scheduled. Pt instructed to call office if any further questions/concerns arise.

## 2023-04-12 NOTE — SUBJECTIVE
[FreeTextEntry7] : Reports pain is worse with sitting. States that she has not been taking any medication for the last 2 days.  [FreeTextEntry6] : Good appetite.  [FreeTextEntry5] : Denies dysuria, urinary frequency, urgency or incontinence.  [FreeTextEntry4] : BM regular, last BM was today.  [FreeTextEntry3] : Ambulating without difficulty.

## 2023-04-14 LAB — BACTERIA UR CULT: NORMAL

## 2023-04-19 ENCOUNTER — APPOINTMENT (OUTPATIENT)
Dept: UROGYNECOLOGY | Facility: CLINIC | Age: 43
End: 2023-04-19
Payer: COMMERCIAL

## 2023-04-19 DIAGNOSIS — Z98.890 OTHER SPECIFIED POSTPROCEDURAL STATES: ICD-10-CM

## 2023-04-19 PROCEDURE — 99024 POSTOP FOLLOW-UP VISIT: CPT

## 2023-04-19 NOTE — SUBJECTIVE
[FreeTextEntry1] : Reports vaginal bleeding that started 2 days ago.  [FreeTextEntry7] : Pain controlled.  [FreeTextEntry6] : Good appetite.  [FreeTextEntry5] : Denies dysuria, urinary incontinence, urinary frequency or urgency.  [FreeTextEntry4] : BM regular with stool softeners.  [FreeTextEntry3] : Ambulating without difficulty.  [FreeTextEntry2] : Sleeping without difficulty.

## 2023-04-19 NOTE — OBJECTIVE
[Soft and Nontender] : soft and nontender [Clean, Dry, Intact] : Clean, Dry, Intact [Good Support] : Good support [Healing well] : healing well [No Masses or Tenderness] : no masses or tenderness [Voiding Trial] : No voiding trial was performed [Post Void Residual ____ ml] : Post Void Residual was [unfilled] ml [FreeTextEntry2] : N [FreeTextEntry3] : No mesh tenderness or mesh erosion. No bleeding from vaginal incisions; bleeding visibly coming from cervix.

## 2023-04-19 NOTE — DISCUSSION/SUMMARY
[FreeTextEntry1] : \mike Mckinney is a 43 yo s/p anterior repair, posterior repair, midurethral sling, cystoscopy on 4/6/2023. Postoperative instructions were discussed again. Bleeding coming from cervix; appears to be patient menstrual cycle and this was discussed with patient. Vaginal incisions healing well; no bleeding noted. RTO in 4 weeks or sooner if issues arise. All questions answered. Pt instructed to call office if any further questions/concerns arise.

## 2023-04-27 ENCOUNTER — EMERGENCY (EMERGENCY)
Facility: HOSPITAL | Age: 43
LOS: 1 days | Discharge: HOME | End: 2023-04-27
Attending: STUDENT IN AN ORGANIZED HEALTH CARE EDUCATION/TRAINING PROGRAM
Payer: COMMERCIAL

## 2023-04-27 VITALS
SYSTOLIC BLOOD PRESSURE: 116 MMHG | DIASTOLIC BLOOD PRESSURE: 81 MMHG | RESPIRATION RATE: 18 BRPM | HEART RATE: 99 BPM | TEMPERATURE: 99 F | OXYGEN SATURATION: 100 %

## 2023-04-27 VITALS
OXYGEN SATURATION: 98 % | HEIGHT: 62 IN | HEART RATE: 115 BPM | RESPIRATION RATE: 20 BRPM | WEIGHT: 177.91 LBS | DIASTOLIC BLOOD PRESSURE: 77 MMHG | SYSTOLIC BLOOD PRESSURE: 110 MMHG | TEMPERATURE: 99 F

## 2023-04-27 DIAGNOSIS — Z98.890 OTHER SPECIFIED POSTPROCEDURAL STATES: Chronic | ICD-10-CM

## 2023-04-27 DIAGNOSIS — C85.92 NON-HODGKIN LYMPHOMA, UNSPECIFIED, INTRATHORACIC LYMPH NODES: Chronic | ICD-10-CM

## 2023-04-27 LAB
ALBUMIN SERPL ELPH-MCNC: 4.2 G/DL — SIGNIFICANT CHANGE UP (ref 3.3–5)
ALP SERPL-CCNC: 82 U/L — SIGNIFICANT CHANGE UP (ref 40–120)
ALT FLD-CCNC: 97 U/L — HIGH (ref 10–45)
ANION GAP SERPL CALC-SCNC: 11 MMOL/L — SIGNIFICANT CHANGE UP (ref 5–17)
APPEARANCE UR: CLEAR — SIGNIFICANT CHANGE UP
APTT BLD: 29.1 SEC — SIGNIFICANT CHANGE UP (ref 27.5–35.5)
AST SERPL-CCNC: 58 U/L — HIGH (ref 10–40)
BACTERIA # UR AUTO: NEGATIVE — SIGNIFICANT CHANGE UP
BASOPHILS # BLD AUTO: 0.03 K/UL — SIGNIFICANT CHANGE UP (ref 0–0.2)
BASOPHILS NFR BLD AUTO: 0.4 % — SIGNIFICANT CHANGE UP (ref 0–2)
BILIRUB SERPL-MCNC: 0.3 MG/DL — SIGNIFICANT CHANGE UP (ref 0.2–1.2)
BILIRUB UR-MCNC: NEGATIVE — SIGNIFICANT CHANGE UP
BLD GP AB SCN SERPL QL: NEGATIVE — SIGNIFICANT CHANGE UP
BUN SERPL-MCNC: 14 MG/DL — SIGNIFICANT CHANGE UP (ref 7–23)
CALCIUM SERPL-MCNC: 9.1 MG/DL — SIGNIFICANT CHANGE UP (ref 8.4–10.5)
CHLORIDE SERPL-SCNC: 103 MMOL/L — SIGNIFICANT CHANGE UP (ref 96–108)
CO2 SERPL-SCNC: 24 MMOL/L — SIGNIFICANT CHANGE UP (ref 22–31)
COLOR SPEC: SIGNIFICANT CHANGE UP
CREAT SERPL-MCNC: 0.41 MG/DL — LOW (ref 0.5–1.3)
DIFF PNL FLD: ABNORMAL
EGFR: 127 ML/MIN/1.73M2 — SIGNIFICANT CHANGE UP
EOSINOPHIL # BLD AUTO: 0.19 K/UL — SIGNIFICANT CHANGE UP (ref 0–0.5)
EOSINOPHIL NFR BLD AUTO: 2.5 % — SIGNIFICANT CHANGE UP (ref 0–6)
EPI CELLS # UR: 2 /HPF — SIGNIFICANT CHANGE UP
GLUCOSE SERPL-MCNC: 104 MG/DL — HIGH (ref 70–99)
GLUCOSE UR QL: NEGATIVE — SIGNIFICANT CHANGE UP
HCG SERPL-ACNC: <2 MIU/ML — SIGNIFICANT CHANGE UP
HCT VFR BLD CALC: 37.6 % — SIGNIFICANT CHANGE UP (ref 34.5–45)
HGB BLD-MCNC: 12 G/DL — SIGNIFICANT CHANGE UP (ref 11.5–15.5)
HYALINE CASTS # UR AUTO: 2 /LPF — SIGNIFICANT CHANGE UP (ref 0–2)
IMM GRANULOCYTES NFR BLD AUTO: 0.4 % — SIGNIFICANT CHANGE UP (ref 0–0.9)
INR BLD: 1.08 RATIO — SIGNIFICANT CHANGE UP (ref 0.88–1.16)
KETONES UR-MCNC: NEGATIVE — SIGNIFICANT CHANGE UP
LEUKOCYTE ESTERASE UR-ACNC: NEGATIVE — SIGNIFICANT CHANGE UP
LYMPHOCYTES # BLD AUTO: 1.89 K/UL — SIGNIFICANT CHANGE UP (ref 1–3.3)
LYMPHOCYTES # BLD AUTO: 24.7 % — SIGNIFICANT CHANGE UP (ref 13–44)
MCHC RBC-ENTMCNC: 26.7 PG — LOW (ref 27–34)
MCHC RBC-ENTMCNC: 31.9 GM/DL — LOW (ref 32–36)
MCV RBC AUTO: 83.7 FL — SIGNIFICANT CHANGE UP (ref 80–100)
MONOCYTES # BLD AUTO: 0.5 K/UL — SIGNIFICANT CHANGE UP (ref 0–0.9)
MONOCYTES NFR BLD AUTO: 6.5 % — SIGNIFICANT CHANGE UP (ref 2–14)
NEUTROPHILS # BLD AUTO: 5.02 K/UL — SIGNIFICANT CHANGE UP (ref 1.8–7.4)
NEUTROPHILS NFR BLD AUTO: 65.5 % — SIGNIFICANT CHANGE UP (ref 43–77)
NITRITE UR-MCNC: NEGATIVE — SIGNIFICANT CHANGE UP
NRBC # BLD: 0 /100 WBCS — SIGNIFICANT CHANGE UP (ref 0–0)
PH UR: 7 — SIGNIFICANT CHANGE UP (ref 5–8)
PLATELET # BLD AUTO: 273 K/UL — SIGNIFICANT CHANGE UP (ref 150–400)
POTASSIUM SERPL-MCNC: 4 MMOL/L — SIGNIFICANT CHANGE UP (ref 3.5–5.3)
POTASSIUM SERPL-SCNC: 4 MMOL/L — SIGNIFICANT CHANGE UP (ref 3.5–5.3)
PROT SERPL-MCNC: 7.1 G/DL — SIGNIFICANT CHANGE UP (ref 6–8.3)
PROT UR-MCNC: ABNORMAL
PROTHROM AB SERPL-ACNC: 12.5 SEC — SIGNIFICANT CHANGE UP (ref 10.5–13.4)
RBC # BLD: 4.49 M/UL — SIGNIFICANT CHANGE UP (ref 3.8–5.2)
RBC # FLD: 13.2 % — SIGNIFICANT CHANGE UP (ref 10.3–14.5)
RBC CASTS # UR COMP ASSIST: 10 /HPF — HIGH (ref 0–4)
RH IG SCN BLD-IMP: POSITIVE — SIGNIFICANT CHANGE UP
SODIUM SERPL-SCNC: 138 MMOL/L — SIGNIFICANT CHANGE UP (ref 135–145)
SP GR SPEC: >1.05 (ref 1.01–1.02)
UROBILINOGEN FLD QL: NEGATIVE — SIGNIFICANT CHANGE UP
WBC # BLD: 7.66 K/UL — SIGNIFICANT CHANGE UP (ref 3.8–10.5)
WBC # FLD AUTO: 7.66 K/UL — SIGNIFICANT CHANGE UP (ref 3.8–10.5)
WBC UR QL: 4 /HPF — SIGNIFICANT CHANGE UP (ref 0–5)

## 2023-04-27 PROCEDURE — 86901 BLOOD TYPING SEROLOGIC RH(D): CPT

## 2023-04-27 PROCEDURE — 87086 URINE CULTURE/COLONY COUNT: CPT

## 2023-04-27 PROCEDURE — 86900 BLOOD TYPING SEROLOGIC ABO: CPT

## 2023-04-27 PROCEDURE — 85025 COMPLETE CBC W/AUTO DIFF WBC: CPT

## 2023-04-27 PROCEDURE — 81001 URINALYSIS AUTO W/SCOPE: CPT

## 2023-04-27 PROCEDURE — 85610 PROTHROMBIN TIME: CPT

## 2023-04-27 PROCEDURE — 86850 RBC ANTIBODY SCREEN: CPT

## 2023-04-27 PROCEDURE — 80053 COMPREHEN METABOLIC PANEL: CPT

## 2023-04-27 PROCEDURE — 85730 THROMBOPLASTIN TIME PARTIAL: CPT

## 2023-04-27 PROCEDURE — 99283 EMERGENCY DEPT VISIT LOW MDM: CPT | Mod: GC

## 2023-04-27 PROCEDURE — 74177 CT ABD & PELVIS W/CONTRAST: CPT | Mod: MA

## 2023-04-27 PROCEDURE — 99284 EMERGENCY DEPT VISIT MOD MDM: CPT | Mod: 25

## 2023-04-27 PROCEDURE — 74177 CT ABD & PELVIS W/CONTRAST: CPT | Mod: 26,MA

## 2023-04-27 PROCEDURE — 84702 CHORIONIC GONADOTROPIN TEST: CPT

## 2023-04-27 PROCEDURE — 99285 EMERGENCY DEPT VISIT HI MDM: CPT

## 2023-04-27 NOTE — ED PROVIDER NOTE - PATIENT PORTAL LINK FT
You can access the FollowMyHealth Patient Portal offered by [facility] by registering at the following website: [email or web address]. By joining [employer]’s FollowMyHealth portal, you will also be able to view your health information using other applications (apps) compatible with our system.

## 2023-04-27 NOTE — ED PROVIDER NOTE - CLINICAL SUMMARY MEDICAL DECISION MAKING FREE TEXT BOX
This is a 42 year old female with pmh of HLD, GERD, PCOS, seasonal allergies, and JANN, stress incontinence s/p anterior/posterior vaginal wall repair, miduretheral sling on 04/06/23 with Dr. Radha Coleman presenting with vaginal bleeding with clots since 04/17. Hemodynamically stable, however tachycardia. No visualized active clots/bleeding from vaginal vault. Will obtain labs to monitor for any acute blood loss. Will obtain CT abdomen/pelvis w/ IV contrast over ultrasound as  exam consistent with no active bleeding and intact sutures. This is a 42 year old female with pmh of HLD, GERD, PCOS, seasonal allergies, and JANN, stress incontinence s/p anterior/posterior vaginal wall repair, miduretheral sling on 04/06/23 with Dr. Radha Coleman presenting with vaginal bleeding with clots since 04/17. Hemodynamically stable, however tachycardia. No visualized active clots/bleeding from vaginal vault. Will obtain labs to monitor for any acute blood loss. Will obtain CT abdomen/pelvis w/ IV contrast over ultrasound as  exam consistent with no active bleeding and intact sutures.    pettet attending- see attending attestation for my mdm

## 2023-04-27 NOTE — CONSULT NOTE ADULT - SUBJECTIVE AND OBJECTIVE BOX
Gyn Consult Note  ANGELICA REYES  42y  Female [id number]    HPI:43yo F s/p anterior/posterior repair, MUS, cysto on  for stress urinary incontinence and rectocele. She is presenting to ED after passing ~400cc blood clot this AM. She felt dizzy at the time but is no longer feeling dizzy or lightheaded. Denies abdominal pain, fevers, chills, nausea, vomiting.       Name of GYN Physician: Dr. Coleman    OBHx:  2x   GYNHx: H/o rectocele and DWIGHT as above   PMH: Denies   PSH: Panniculectomy   Meds: Denies   All; NKDA    accepts blood    Physical Exam:   General: sitting comfortably in bed, NAD   :  1 tampon removed. External labia wnl.  Bimanual exam with no cervical motion tenderness. Anterior and posterior suture lines palpated, no defects.  Speculum Exam: No active bleeding from os. IUD string in place. Anterior and posterior suture lines visualized, well-healed with no abnormal discharge or bleeding noted. Physiologic discharge mixed with blood.   Ext: non-tender b/l, no edema     LABS:  pending              I&O's Detail          RADIOLOGY & ADDITIONAL STUDIES:  pending

## 2023-04-27 NOTE — CONSULT NOTE ADULT - ASSESSMENT
A/P: 41yo POD#21 s/p A/P repair, MUS, cysto for rectocele and DWIGHT presenting after passing 400cc blood clot this AM with no further bleeding visualized on exam. No signs or symptoms of infection.   - Rec CBC/BMP, CTAP w contrast  - Urogyn to follow     Seen and d/w Dr. Greco, Urogyn Fellow   FLORENTIN Lamar, PGY2

## 2023-04-27 NOTE — ED ADULT TRIAGE NOTE - HEIGHT IN FEET
Prescription approved per [facility] Refill Protocol.    TERI HairstonN, RN  [facility]     5

## 2023-04-27 NOTE — CONSULT NOTE ADULT - ATTENDING COMMENTS
Faye is a 41yo s/p anterior posterior repair, midurethral sling, cystoscopy on 4/6/23. She presented to the ED after passing a large blood clot but denies any further bleeding. Her initial exam did not show any active bleeding, and she had no bleeding on repeat digital exam. She was slightly tachycardic upon presentation but her HR has been normal since. She denies any lightheadedness, dizziness, palpitations, chest pain, shortness of breath. Her H/h was stable and CT was unremarkable. Her LFTs were mildly elevated, and she will follow up with her PCP for transaminitis. She is stable for discharge home. She was given precautions and will follow up in the office tomorrow.

## 2023-04-27 NOTE — CONSULT NOTE ADULT - NSCONSULTADDITIONALINFOA_GEN_ALL_CORE
Urogynecology Fellow Note    Patient seen and examined. She reported that she had bleeding from 4/17 for several days, then the bleeding stopped for 2 days then she resumed having bleeding over the last couple days but today was much more. She reports that she had soaked through her clothes with bleeding and went to see her GYN today in the office who performed in office ultrasound which showed IUD strings (Mirena), ovarian cyst and EMS of 8mm. She denied dizziness, palpitations, nausea, vomiting, shortness of breath.     CT A/P today: No acute intraabdominal or pelvic pathology  Hct: 37.6 and prior 03/17/2023 was 43.7.   Vitals wnl, had slight tachycardia in triage - but since HR in ER has been wnl.   INR wnl.     Exam:   Gen: NAD  Pulm: speaking in full sentences, no use of accessory muscles  GI: soft, nontender, nondistended.   : tampon removed which had a mild to moderate amount of blood on it. pad had  scant bleeding noted. Speculum exam performed and pressure applied to anterior incision and posterior incision with no bleeding noted from incisions. IUD strings noted with scant amount of blood noted from this site. Incisions of anterior/posterior are well approximated and healing well. No signs of any acute bleeding.     Assessment: Ms. Reyes is s/p APR, MUS, cystoscopy on 4/6/2023.     Plan:   -Pt provided with her ast/alt elevation and instructed to follow up with her PCP (PCP is not [facility] physician)   -Follow up in urogynecology office tomorrow   -Discussed with patient pad counts and precautions provided.
stated

## 2023-04-27 NOTE — ED PROVIDER NOTE - PHYSICAL EXAMINATION
General: NAD  HEENT: NCAT.   Cardiac: RRR, 2+ radial pulses  Chest: CTA  Abdomen: soft, non-distended, no ttp, no rebound or guarding  Extremities: no peripheral edema, calf tenderness, or leg size discrepancies  : Exam with Dr. Lamar. No obvious active bleeding in vaginal vault. Sutures intact. No obvious hematoma. No clots visualized.   Skin: no rashes  Neuro: AAOx3, motor and sensory grossly intact.   Psych: mood and affect appropriate

## 2023-04-27 NOTE — ED PROVIDER NOTE - NSFOLLOWUPINSTRUCTIONS_ED_ALL_ED_FT
No signs of emergency medical condition on today's workup.  Presumptive diagnosis made, but further evaluation may be required by your primary care doctor or specialist for a definitive diagnosis.  Therefore, follow up as directed and if symptoms change/worsen or any emergency conditions, please return to the ER.    YOU WERE SEEN FOR vaginal bleeding/clot    YOU HAD labs and imaging done, which were unremarkable.   Please follow up with your primary care doctor regarding slight elevation of your liver enzyme  Please follow up with your ob/gyn doctor.     RETURN TO THE EMERGENCY DEPARTMENT FOR worsening abdominal pain, vomiting, fever, or any new/concerning symptoms.

## 2023-04-27 NOTE — ED ADULT TRIAGE NOTE - CHIEF COMPLAINT QUOTE
vaginal bleeding with clots since 04/17. complaints of generalized weakness. uterine prolapse surgery on 04/06. sent by GYN for further workout.

## 2023-04-27 NOTE — ED PROVIDER NOTE - ATTENDING CONTRIBUTION TO CARE
I, Alfonso Ramírez, performed a history and physical exam of the patient and discussed their management with the resident and/or advanced care provider. I reviewed the resident and/or advanced care provider's note and agree with the documented findings and plan of care. I was present and available for all procedures.    This is a 42 year old female with pmh of HLD, GERD, PCOS, seasonal allergies, and JANN, stress incontinence s/p anterior/posterior vaginal wall repair, miduretheral sling on 04/06/23 with Dr. Radha Coleman presenting with vaginal bleeding with clots since 04/17. Sent from GYN office due to continued vaginal bleeding along with finding of clot visualized. Denies any fever/chills, n/v/d. Denies any sexual activity or putting any objects in vagina.    Well appearing and in NAD, head normal appearing atraumatic, trachea midline, no respiratory distress, lungs cta bilaterally, rrr no murmurs, soft NT ND abdomen, no visible extremity deformities, Alert and oriented, non focal neuro exam, skin warm and dry, normal affect and mood, no leg swelling, calf ttp or jvd    Vaginal bleeding with recent surgery otherwise well-appearing asymptomatic clots less and less now patient reports otherwise will have GYN evaluate screening CT scan abdomen pelvis with IV contrast screening blood work urine analysis discussed with patient agreeable with plan unlikely other intra-abdominal or pelvic pathology requiring further ultrasound close monitoring frequent reassessments disposition pending work-up pending OB/GYN recommendations

## 2023-04-27 NOTE — ED PROVIDER NOTE - PROGRESS NOTE DETAILS
ct back, pending gyn reccs and dispo Darshan Wolff, PGY1 - gyn contacted. Stable for discharge. f/u pcp with incidental finding of elevated liver enzyme. Haroon combs. Darshan Wolff, PGY1 - talked to patient. Will dc with pcp f/u. Patient verbalized understanding and agrees with the plan.

## 2023-04-27 NOTE — ED ADULT NURSE NOTE - OBJECTIVE STATEMENT
Pt is 42y F with PMH of PCOS and HLD complaining of vaginal bleeding since 4/17. Pt reports having pelvic organ prolapse surgery 4/6, and reports vaginal bleeding with clots since 4/17. Pt visited GYN today, reports 400cc blood clot and pt was referred to ED for followup. Pt reports soaking 5 pads this morning. Reports dizziness but denies pain and pregnancy. A&Ox4

## 2023-04-27 NOTE — ED PROVIDER NOTE - OBJECTIVE STATEMENT
This is a 42 year old female with pmh of HLD, GERD, PCOS, seasonal allergies, and JANN, stress incontinence s/p anterior/posterior vaginal wall repair, miduretheral sling on 04/06/23 with Dr. Radha Coleman presenting with vaginal bleeding with clots since 04/17. Sent from GYN office due to continued vaginal bleeding along with finding of clot visualized. Denies any fever/chills, n/v/d. Denies any sexual activity or putting any objects in vagina.

## 2023-04-28 ENCOUNTER — APPOINTMENT (OUTPATIENT)
Dept: UROGYNECOLOGY | Facility: CLINIC | Age: 43
End: 2023-04-28
Payer: COMMERCIAL

## 2023-04-28 VITALS
SYSTOLIC BLOOD PRESSURE: 120 MMHG | HEART RATE: 74 BPM | WEIGHT: 178 LBS | HEIGHT: 62 IN | BODY MASS INDEX: 32.76 KG/M2 | DIASTOLIC BLOOD PRESSURE: 74 MMHG

## 2023-04-28 LAB
CULTURE RESULTS: SIGNIFICANT CHANGE UP
SPECIMEN SOURCE: SIGNIFICANT CHANGE UP

## 2023-04-28 PROCEDURE — 99024 POSTOP FOLLOW-UP VISIT: CPT

## 2023-05-05 ENCOUNTER — APPOINTMENT (OUTPATIENT)
Dept: UROGYNECOLOGY | Facility: CLINIC | Age: 43
End: 2023-05-05
Payer: COMMERCIAL

## 2023-05-05 VITALS
DIASTOLIC BLOOD PRESSURE: 80 MMHG | HEIGHT: 62 IN | SYSTOLIC BLOOD PRESSURE: 122 MMHG | HEART RATE: 101 BPM | BODY MASS INDEX: 32.76 KG/M2 | WEIGHT: 178 LBS

## 2023-05-05 PROCEDURE — 99024 POSTOP FOLLOW-UP VISIT: CPT

## 2023-05-05 NOTE — OBJECTIVE
[Soft and Nontender] : soft and nontender [Clean, Dry, Intact] : Clean, Dry, Intact [Good Support] : Good support [Healing well] : healing well [No Masses or Tenderness] : no masses or tenderness [FreeTextEntry3] : No mesh erosion or tenderness; no erythema noted on incisions; healing well.

## 2023-05-05 NOTE — SUBJECTIVE
[FreeTextEntry1] : Patient reports doing well; no further bleeding.  [FreeTextEntry7] : Pain controlled.  [FreeTextEntry6] : Good appetite.  [FreeTextEntry5] : Denies urinary incontinence, dysuria, urinary frequency, urgency.  [FreeTextEntry4] : BM regular.  [FreeTextEntry3] : Ambulating without difficulty.  [FreeTextEntry2] : Sleeping without difficulty.

## 2023-05-05 NOTE — DISCUSSION/SUMMARY
[FreeTextEntry1] : Faye is a 41 yo s/p above procedure. Doing well postoperatively now with no further bleeding since last week. Finishing the 7 day course of Augmentin today. RTO in 2 months or sooner if issues arise. All questions answered. PT instructed to call office if any further questions/concerns arise.

## 2023-05-08 NOTE — OBJECTIVE
[Soft and Nontender] : soft and nontender [Clean, Dry, Intact] : Clean, Dry, Intact [Good Support] : Good support [Healing well] : healing well [No Masses or Tenderness] : no masses or tenderness [FreeTextEntry3] : Incisions in the vagina healing well; posterior incision with small amount of seperation about 3mm in size at the introitus; incisions appear slightly irritated.  (exam performed by Dr. Coleman)

## 2023-05-08 NOTE — SUBJECTIVE
[FreeTextEntry1] : Patient went to the ER after she went to her GYN yesterday after expelling ~400-500cc of vaginal blood/clot. Sonogram was performed in the office with IUD in place and EMS of 8mm. CT A/P in the ER was performed which was negative and Hct returned at 37.6 with no further bleeding while in the ER. Today, she reports small amount of bleeding occurred this morning with 2 small blood clots expelled.  [FreeTextEntry7] : Pain controlled.  [FreeTextEntry6] : Good appetite.  [FreeTextEntry5] : Denies urinary incontinence, dysuria, urinary frequency, urgency.  [FreeTextEntry4] : BM regular.  [FreeTextEntry3] : Ambulating without difficulty.  [FreeTextEntry2] : Sleeping without difficulty.  [FreeTextEntry9] : She denies family hx of bleeding disorders; issues with bleeding in her 2 prior vaginal deliveries or bleeding issues at the time of her prior surgery (abdominoplasty).

## 2023-05-08 NOTE — DISCUSSION/SUMMARY
[FreeTextEntry1] : Faye is a 41 yo s/p above procedure. We discussed that the bleeding does not appear to be coming from the incisions as they are well healing/well approximated. We discussed her superficial separation of posterior stitches at the introitus and will empirically treat for infection with Augmentin BID x 7 days. Discussed use of Culturelle during this time and if she has any vaginal itching/yeast infection symptoms to call the office so we can send treatment for this. All questions answered. RTO in 1 week or sooner if issues arise.

## 2023-05-10 ENCOUNTER — NON-APPOINTMENT (OUTPATIENT)
Age: 43
End: 2023-05-10

## 2023-05-10 ENCOUNTER — APPOINTMENT (OUTPATIENT)
Dept: ALLERGY | Facility: CLINIC | Age: 43
End: 2023-05-10
Payer: COMMERCIAL

## 2023-05-10 VITALS
TEMPERATURE: 98 F | DIASTOLIC BLOOD PRESSURE: 78 MMHG | HEART RATE: 99 BPM | RESPIRATION RATE: 16 BRPM | OXYGEN SATURATION: 98 % | BODY MASS INDEX: 32.76 KG/M2 | HEIGHT: 62 IN | WEIGHT: 178 LBS | SYSTOLIC BLOOD PRESSURE: 118 MMHG

## 2023-05-10 PROCEDURE — 99203 OFFICE O/P NEW LOW 30 MIN: CPT

## 2023-05-10 RX ORDER — FLUTICASONE PROPIONATE 50 UG/1
50 SPRAY, METERED NASAL DAILY
Qty: 1 | Refills: 2 | Status: ACTIVE | COMMUNITY
Start: 2023-05-10 | End: 1900-01-01

## 2023-05-10 RX ORDER — OLOPATADINE HCL 1 MG/ML
0.1 SOLUTION/ DROPS OPHTHALMIC TWICE DAILY
Qty: 1 | Refills: 2 | Status: ACTIVE | COMMUNITY
Start: 2023-05-10 | End: 1900-01-01

## 2023-05-10 RX ORDER — AMOXICILLIN AND CLAVULANATE POTASSIUM 875; 125 MG/1; MG/1
875-125 TABLET, COATED ORAL
Qty: 14 | Refills: 0 | Status: DISCONTINUED | COMMUNITY
Start: 2023-04-28 | End: 2023-05-10

## 2023-05-10 NOTE — REVIEW OF SYSTEMS
[Heartburn] : heartburn [Nl] : Genitourinary [FreeTextEntry5] : elevated cholesterol  [FreeTextEntry6] : CPAP  [de-identified] : PCOS

## 2023-05-10 NOTE — SOCIAL HISTORY
[House] : [unfilled] lives in a house  [Radiator/Baseboard] : heating provided by radiator(s)/baseboard(s) [Window Units] : air conditioning provided by window units [Basement] :  in basement  [None] : none [] :  [FreeTextEntry1] : Lives with spouse - 2 children - mother\par  media  [Bedroom] : not in the bedroom [Living Area] : not in the living area [Smokers in Household] : there are no smokers in the home

## 2023-05-10 NOTE — HISTORY OF PRESENT ILLNESS
[de-identified] : 4/6/23 - bladder surgery with mesh - rectal prolapse - 3 weeks after surgery vaginal bleeding.   She reports worsening allergies - nasal congestion - itching of body - sneezing - itchy eyes and nose - perennial symptoms - take Zyrtec with some control of her symptoms.   She had allergy testing over 8 years ago - positive to dust mite - mold and animals.\par \par Patient has had sporadic hives - she is controlled with Zyrtec - resolved over time after taking Zyrtec for months.

## 2023-05-17 ENCOUNTER — APPOINTMENT (OUTPATIENT)
Dept: UROGYNECOLOGY | Facility: CLINIC | Age: 43
End: 2023-05-17

## 2023-06-14 ENCOUNTER — APPOINTMENT (OUTPATIENT)
Dept: ALLERGY | Facility: CLINIC | Age: 43
End: 2023-06-14
Payer: COMMERCIAL

## 2023-06-14 VITALS
TEMPERATURE: 98.6 F | DIASTOLIC BLOOD PRESSURE: 74 MMHG | RESPIRATION RATE: 16 BRPM | HEART RATE: 80 BPM | OXYGEN SATURATION: 97 % | BODY MASS INDEX: 32.76 KG/M2 | SYSTOLIC BLOOD PRESSURE: 112 MMHG | HEIGHT: 62 IN | WEIGHT: 178 LBS

## 2023-06-14 PROCEDURE — 95004 PERQ TESTS W/ALRGNC XTRCS: CPT

## 2023-06-14 PROCEDURE — 95018 ALL TSTG PERQ&IQ DRUGS/BIOL: CPT

## 2023-06-14 PROCEDURE — 99212 OFFICE O/P EST SF 10 MIN: CPT | Mod: 25

## 2023-06-14 NOTE — PHYSICAL EXAM
[Alert] : alert [Well Nourished] : well nourished [Healthy Appearance] : healthy appearance [No Acute Distress] : no acute distress [Well Developed] : well developed [Normal Voice/Communication] : normal voice communication [Normal TMs] : both tympanic membranes were normal [Normal Nasal Mucosa] : the nasal mucosa was normal [Boggy Nasal Turbinates] : no boggy and/or pale nasal turbinates [No Neck Mass] : no neck mass was observed [No LAD] : no lymphadenopathy [Normal Rate and Effort] : normal respiratory rhythm and effort [No Crackles] : no crackles [No Retractions] : no retractions [Bilateral Audible Breath Sounds] : bilateral audible breath sounds [Wheezing] : no wheezing was heard [Normal Rate] : heart rate was normal  [Normal S1, S2] : normal S1 and S2 [No murmur] : no murmur [Regular Rhythm] : with a regular rhythm [Normal Cervical Lymph Nodes] : cervical [No Rash] : no rash [No Joint Swelling or Erythema] : no joint swelling or erythema [Normal Mood] : mood was normal [Normal Affect] : affect was normal

## 2023-06-14 NOTE — HISTORY OF PRESENT ILLNESS
[de-identified] : Patient is using Flonase - she has itching of her mouth off of antihistamines.

## 2023-06-14 NOTE — SOCIAL HISTORY
[FreeTextEntry1] : Lives with spouse - 2 children - mother\par  media  [House] : [unfilled] lives in a house  [Radiator/Baseboard] : heating provided by radiator(s)/baseboard(s) [Window Units] : air conditioning provided by window units [Bedroom] : not in the bedroom [Basement] :  in basement  [Living Area] : not in the living area [None] : none [] :  [Smokers in Household] : there are no smokers in the home

## 2023-06-14 NOTE — IMPRESSION
[_____] : dust mites ([unfilled]) [Allergy Testing Cockroach] : cockroach [Allergy Testing Dog] : dog [Allergy Testing Cat] : cat [] : molds [Allergy Testing Trees] : trees [Allergy Testing Weeds] : weeds [Allergy Testing Grasses] : grasses

## 2023-06-14 NOTE — ASSESSMENT
[FreeTextEntry1] : Perennial allergic rhinoconjunctivitis:\par \par Stop Zyrtec for skin testing \par Flonase 2 puffs QD\par Pataday BID \par RV environmental ID testing

## 2023-06-14 NOTE — REVIEW OF SYSTEMS
[Heartburn] : heartburn [Nl] : Genitourinary [FreeTextEntry5] : elevated cholesterol  [FreeTextEntry6] : CPAP  [de-identified] : PCOS

## 2023-06-16 ENCOUNTER — APPOINTMENT (OUTPATIENT)
Dept: ULTRASOUND IMAGING | Facility: CLINIC | Age: 43
End: 2023-06-16
Payer: COMMERCIAL

## 2023-06-16 ENCOUNTER — APPOINTMENT (OUTPATIENT)
Dept: MAMMOGRAPHY | Facility: CLINIC | Age: 43
End: 2023-06-16
Payer: COMMERCIAL

## 2023-06-16 ENCOUNTER — OUTPATIENT (OUTPATIENT)
Dept: OUTPATIENT SERVICES | Facility: HOSPITAL | Age: 43
LOS: 1 days | End: 2023-06-16
Payer: COMMERCIAL

## 2023-06-16 DIAGNOSIS — Z98.890 OTHER SPECIFIED POSTPROCEDURAL STATES: Chronic | ICD-10-CM

## 2023-06-16 DIAGNOSIS — C85.92 NON-HODGKIN LYMPHOMA, UNSPECIFIED, INTRATHORACIC LYMPH NODES: Chronic | ICD-10-CM

## 2023-06-16 DIAGNOSIS — Z00.8 ENCOUNTER FOR OTHER GENERAL EXAMINATION: ICD-10-CM

## 2023-06-16 PROCEDURE — 77063 BREAST TOMOSYNTHESIS BI: CPT

## 2023-06-16 PROCEDURE — 77067 SCR MAMMO BI INCL CAD: CPT | Mod: 26

## 2023-06-16 PROCEDURE — 76641 ULTRASOUND BREAST COMPLETE: CPT

## 2023-06-16 PROCEDURE — 76641 ULTRASOUND BREAST COMPLETE: CPT | Mod: 26,LT

## 2023-06-16 PROCEDURE — 77067 SCR MAMMO BI INCL CAD: CPT

## 2023-06-16 PROCEDURE — 76641 ULTRASOUND BREAST COMPLETE: CPT | Mod: 26,RT

## 2023-06-16 PROCEDURE — 77063 BREAST TOMOSYNTHESIS BI: CPT | Mod: 26

## 2023-07-11 ENCOUNTER — APPOINTMENT (OUTPATIENT)
Dept: ALLERGY | Facility: CLINIC | Age: 43
End: 2023-07-11
Payer: COMMERCIAL

## 2023-07-11 VITALS
SYSTOLIC BLOOD PRESSURE: 134 MMHG | BODY MASS INDEX: 32.76 KG/M2 | OXYGEN SATURATION: 99 % | WEIGHT: 178 LBS | DIASTOLIC BLOOD PRESSURE: 81 MMHG | HEART RATE: 88 BPM | HEIGHT: 62 IN

## 2023-07-11 PROCEDURE — 95018 ALL TSTG PERQ&IQ DRUGS/BIOL: CPT

## 2023-07-11 PROCEDURE — 99213 OFFICE O/P EST LOW 20 MIN: CPT | Mod: 25

## 2023-07-11 NOTE — REVIEW OF SYSTEMS
[Heartburn] : heartburn [Nl] : Genitourinary [FreeTextEntry5] : elevated cholesterol  [FreeTextEntry6] : CPAP  [de-identified] : PCOS

## 2023-07-11 NOTE — PHYSICAL EXAM
[Alert] : alert [Well Nourished] : well nourished [Healthy Appearance] : healthy appearance [No Acute Distress] : no acute distress [Well Developed] : well developed [Normal Voice/Communication] : normal voice communication [Normal TMs] : both tympanic membranes were normal [Normal Nasal Mucosa] : the nasal mucosa was normal [Boggy Nasal Turbinates] : no boggy and/or pale nasal turbinates [No Neck Mass] : no neck mass was observed [No LAD] : no lymphadenopathy [Normal Rate and Effort] : normal respiratory rhythm and effort [No Crackles] : no crackles [No Retractions] : no retractions [Bilateral Audible Breath Sounds] : bilateral audible breath sounds [Wheezing] : no wheezing was heard [Normal Rate] : heart rate was normal  [Normal S1, S2] : normal S1 and S2 [No murmur] : no murmur [Regular Rhythm] : with a regular rhythm [Normal Cervical Lymph Nodes] : cervical [No Rash] : no rash [No Joint Swelling or Erythema] : no joint swelling or erythema [Normal Mood] : mood was normal [Normal Affect] : affect was normal English

## 2023-07-11 NOTE — ASSESSMENT
[FreeTextEntry1] : Perennial allergic rhinoconjunctivitis:\par \par Dust mite avoidance reviewed \par Flonase 2 puffs QD\par Pataday BID \par Restart Zyrtec QD\par \par Chronic urticaria:\par \par Restart Zyrtec 10 mg QD \par \par RV prn

## 2023-07-12 ENCOUNTER — APPOINTMENT (OUTPATIENT)
Dept: UROGYNECOLOGY | Facility: CLINIC | Age: 43
End: 2023-07-12
Payer: COMMERCIAL

## 2023-07-12 DIAGNOSIS — Z98.890 OTHER SPECIFIED POSTPROCEDURAL STATES: ICD-10-CM

## 2023-07-12 PROCEDURE — 99213 OFFICE O/P EST LOW 20 MIN: CPT

## 2023-08-31 NOTE — DISCUSSION/SUMMARY
[FreeTextEntry1] : Faye is a 42F s/p anterior/posterior repair, MUS on 4/6/2023 complicated by postop vaginal bleeding, now doing well.  Discussed that patient may use water-based lubrication for sexual intercourse.  Discussed that patient no longer has any postoperative restrictions. She may follow up as needed. Patient verbalized understanding.  All questions answered.\par \par

## 2023-09-19 ENCOUNTER — OUTPATIENT (OUTPATIENT)
Dept: OUTPATIENT SERVICES | Facility: HOSPITAL | Age: 43
LOS: 1 days | End: 2023-09-19
Payer: COMMERCIAL

## 2023-09-19 ENCOUNTER — APPOINTMENT (OUTPATIENT)
Dept: ULTRASOUND IMAGING | Facility: CLINIC | Age: 43
End: 2023-09-19
Payer: COMMERCIAL

## 2023-09-19 DIAGNOSIS — C85.92 NON-HODGKIN LYMPHOMA, UNSPECIFIED, INTRATHORACIC LYMPH NODES: Chronic | ICD-10-CM

## 2023-09-19 DIAGNOSIS — Z98.890 OTHER SPECIFIED POSTPROCEDURAL STATES: Chronic | ICD-10-CM

## 2023-09-19 DIAGNOSIS — R94.5 ABNORMAL RESULTS OF LIVER FUNCTION STUDIES: ICD-10-CM

## 2023-09-19 PROCEDURE — 76700 US EXAM ABDOM COMPLETE: CPT

## 2023-09-19 PROCEDURE — 76700 US EXAM ABDOM COMPLETE: CPT | Mod: 26

## 2023-10-16 ENCOUNTER — APPOINTMENT (OUTPATIENT)
Dept: ENDOCRINOLOGY | Facility: CLINIC | Age: 43
End: 2023-10-16
Payer: COMMERCIAL

## 2023-10-16 VITALS
HEART RATE: 79 BPM | DIASTOLIC BLOOD PRESSURE: 80 MMHG | HEIGHT: 62 IN | SYSTOLIC BLOOD PRESSURE: 120 MMHG | BODY MASS INDEX: 34.04 KG/M2 | OXYGEN SATURATION: 98 % | WEIGHT: 185 LBS | TEMPERATURE: 98.4 F

## 2023-10-16 DIAGNOSIS — Z83.3 FAMILY HISTORY OF DIABETES MELLITUS: ICD-10-CM

## 2023-10-16 DIAGNOSIS — L68.0 HIRSUTISM: ICD-10-CM

## 2023-10-16 DIAGNOSIS — K76.0 FATTY (CHANGE OF) LIVER, NOT ELSEWHERE CLASSIFIED: ICD-10-CM

## 2023-10-16 PROCEDURE — 99204 OFFICE O/P NEW MOD 45 MIN: CPT

## 2023-11-01 LAB
17OHP SERPL-MCNC: 50 NG/DL
25(OH)D3 SERPL-MCNC: 27.9 NG/ML
ALBUMIN SERPL ELPH-MCNC: 4.5 G/DL
ALP BLD-CCNC: 109 U/L
ALT SERPL-CCNC: 75 U/L
ANION GAP SERPL CALC-SCNC: 9 MMOL/L
AST SERPL-CCNC: 41 U/L
BASOPHILS # BLD AUTO: 0.02 K/UL
BASOPHILS NFR BLD AUTO: 0.2 %
BILIRUB SERPL-MCNC: 0.6 MG/DL
BUN SERPL-MCNC: 9 MG/DL
CALCIUM SERPL-MCNC: 9.2 MG/DL
CHLORIDE SERPL-SCNC: 102 MMOL/L
CHOLEST SERPL-MCNC: 192 MG/DL
CO2 SERPL-SCNC: 27 MMOL/L
CREAT SERPL-MCNC: 0.54 MG/DL
DHEA-S SERPL-MCNC: 153 UG/DL
EGFR: 119 ML/MIN/1.73M2
EOSINOPHIL # BLD AUTO: 0.21 K/UL
EOSINOPHIL NFR BLD AUTO: 2.5 %
ESTIMATED AVERAGE GLUCOSE: 120 MG/DL
ESTRADIOL SERPL-MCNC: 103 PG/ML
FOLATE SERPL-MCNC: >20 NG/ML
FSH SERPL-MCNC: 5.1 IU/L
GLUCOSE SERPL-MCNC: 103 MG/DL
HBA1C MFR BLD HPLC: 5.8 %
HCT VFR BLD CALC: 42.2 %
HDLC SERPL-MCNC: 50 MG/DL
HGB BLD-MCNC: 13.2 G/DL
IMM GRANULOCYTES NFR BLD AUTO: 0.2 %
INSULIN P FAST SERPL-ACNC: 42.3 UU/ML
LDLC SERPL CALC-MCNC: 116 MG/DL
LH SERPL-ACNC: 15.2 IU/L
LYMPHOCYTES # BLD AUTO: 3.28 K/UL
LYMPHOCYTES NFR BLD AUTO: 38.3 %
MAN DIFF?: NORMAL
MCHC RBC-ENTMCNC: 26.5 PG
MCHC RBC-ENTMCNC: 31.3 GM/DL
MCV RBC AUTO: 84.7 FL
MONOCYTES # BLD AUTO: 0.61 K/UL
MONOCYTES NFR BLD AUTO: 7.1 %
NEUTROPHILS # BLD AUTO: 4.42 K/UL
NEUTROPHILS NFR BLD AUTO: 51.7 %
NONHDLC SERPL-MCNC: 141 MG/DL
PLATELET # BLD AUTO: 252 K/UL
POTASSIUM SERPL-SCNC: 3.6 MMOL/L
PROT SERPL-MCNC: 7 G/DL
RBC # BLD: 4.98 M/UL
RBC # FLD: 14.4 %
SODIUM SERPL-SCNC: 138 MMOL/L
T4 FREE SERPL-MCNC: 1.1 NG/DL
TESTOST SERPL-MCNC: 39.5 NG/DL
TRIGL SERPL-MCNC: 145 MG/DL
TSH SERPL-ACNC: 1.3 UIU/ML
VIT B12 SERPL-MCNC: 372 PG/ML
WBC # FLD AUTO: 8.56 K/UL

## 2023-11-02 LAB — CORTIS SAL-MCNC: NORMAL

## 2023-11-05 NOTE — DISCHARGE NOTE OB - NS DC ANGIO PCI YN
Bed set to lowest position, call light within reach, side rails up x 2. Pt states that pain is well controlled with current medication regimen. Vital signs stable, hourly rounding maintained.  Problem: At Risk for Falls  Goal: # Patient does not fall  Outcome: Outcome Not Met, Continue to Monitor  Goal: # Takes action to control fall-related risks  Outcome: Outcome Not Met, Continue to Monitor  Goal: # Verbalizes understanding of fall risk/precautions  Description: Document education using the patient education activity  Outcome: Outcome Not Met, Continue to Monitor     Problem: At Risk for Injury Due to Fall  Goal: # Patient does not fall  Outcome: Outcome Not Met, Continue to Monitor  Goal: # Takes action to control condition specific risks  Outcome: Outcome Not Met, Continue to Monitor  Goal: # Verbalizes understanding of fall-related injury personal risks  Description: Document education using the patient education activity  Outcome: Outcome Not Met, Continue to Monitor     Problem: Pain  Goal: #Acceptable pain level achieved/maintained at rest using NRS/Faces  Description: This goal is used for patients who can self-report.  Acceptable means the level is at or below the identified comfort/function goal.  Outcome: Outcome Not Met, Continue to Monitor  Goal: # Acceptable pain level achieved/maintained at rest using NRS/Faces without oversedation (opioid naive or PCA/Epidural infusion)  Description: This goal is used if Opioid-naïve or on PCA/Epidural Infusion.  Outcome: Outcome Not Met, Continue to Monitor  Goal: # Acceptable pain level achieved/maintained with activity using NRS/Faces  Description: This goal is used for patients who can self-report and are not achieving acceptable pain control during activity.  Outcome: Outcome Not Met, Continue to Monitor  Goal: Acceptable pain/comfort level is achieved/maintained at rest (based on Pain Behaviors Scale)  Description: This goal is used for patients who are not  able to self-report pain and are assessed for pain using the Pain Behaviors Scale  Outcome: Outcome Not Met, Continue to Monitor  Goal: Acceptable pain/comfort level is achieved/maintained at rest based on PAINAID scale (Dementia)  Description: This goal is used for patients who are not able to self-report pain, have dementia, and assessed using the PAINAD scale.  Outcome: Outcome Not Met, Continue to Monitor  Goal: Acceptable pain/comfort level is achieved/maintained at rest (based on pediatric behavior tool: NIPS, NPASS, or FLACC)  Description: This goal is used for pediatric patients who are not able to self report pain.  Outcome: Outcome Not Met, Continue to Monitor  Goal: # Verbalizes understanding of pain management  Description: Documented in Patient Education Activity  Outcome: Outcome Not Met, Continue to Monitor      no

## 2023-11-17 NOTE — ED PROVIDER NOTE - NS ED NOTE AC HIGH RISK COUNTRIES
You can access the FollowMyHealth Patient Portal offered by [facility] by registering at the following website: [email or web address]. By joining [facility]’s FollowMyHealth portal, you will also be able to view your health information using other applications (apps) compatible with our system. No

## 2024-01-15 ENCOUNTER — RX RENEWAL (OUTPATIENT)
Age: 44
End: 2024-01-15

## 2024-01-26 ENCOUNTER — NON-APPOINTMENT (OUTPATIENT)
Age: 44
End: 2024-01-26

## 2024-01-26 DIAGNOSIS — Z92.89 PERSONAL HISTORY OF OTHER MEDICAL TREATMENT: ICD-10-CM

## 2024-01-26 DIAGNOSIS — Z01.419 ENCOUNTER FOR GYNECOLOGICAL EXAMINATION (GENERAL) (ROUTINE) W/OUT ABNORMAL FINDINGS: ICD-10-CM

## 2024-01-26 DIAGNOSIS — N60.19 DIFFUSE CYSTIC MASTOPATHY OF UNSPECIFIED BREAST: ICD-10-CM

## 2024-01-26 DIAGNOSIS — Z78.9 OTHER SPECIFIED HEALTH STATUS: ICD-10-CM

## 2024-03-01 NOTE — H&P PST ADULT - ALLERGIC/IMMUNOLOGIC COMMENTS
Acute on chronic low back pain, no prior back surgeries. MRI L spine with multilevel chronic degenerative changes and mild levoscoliosis.  - management per NSGY  - pain control with bowel regimen Pre-op risk stratification and medical optimization  - planned procedure: complete exam under anesthesia for pap smear, hysteroscopy D&C, diagnostic laparoscopy, possible robotic hysterectomy, BSO, and debulking  - METS >4, able to climb 20 steps w/o difficulty, indep ADL, no prior or current s/s active cardiopulm disease  - ECG pending  - RCRI score of 1: class II risk (6.0% 30-day risk of MACE)  - NSQIP withabove average risk (0.2% risk of cardiac events, <0.1%risk of death)  - no further cardiac testing needed prior to OR  - patient is medically optimized for planned procedure with risk approximated as above  - please notify if any change in clinical status Pre-op risk stratification and medical optimization  - planned procedure: complete exam under anesthesia for pap smear, hysteroscopy D&C, diagnostic laparoscopy, possible robotic hysterectomy, BSO, and debulking  - METS >4, able to climb 20 steps w/o difficulty, indep ADL, no prior or current s/s active cardiopulm disease  - history of prior surgeries w/o anesthesia related problems   - ECG pending  - RCRI score of 1: class II risk (6.0% 30-day risk of MACE)  - NSQIP withabove average risk (0.2% risk of cardiac events, <0.1%risk of death)  - no further cardiac testing needed prior to OR  - patient is medically optimized for planned procedure with risk approximated as above  - please notify if any change in clinical status see allergies- dust

## 2024-03-04 LAB
ALBUMIN SERPL ELPH-MCNC: 4.6 G/DL
ALP BLD-CCNC: 90 U/L
ALT SERPL-CCNC: 43 U/L
ANION GAP SERPL CALC-SCNC: 10 MMOL/L
AST SERPL-CCNC: 28 U/L
BILIRUB SERPL-MCNC: 0.4 MG/DL
BUN SERPL-MCNC: 11 MG/DL
CALCIUM SERPL-MCNC: 9.4 MG/DL
CHLORIDE SERPL-SCNC: 104 MMOL/L
CHOLEST SERPL-MCNC: 209 MG/DL
CO2 SERPL-SCNC: 26 MMOL/L
CREAT SERPL-MCNC: 0.51 MG/DL
EGFR: 120 ML/MIN/1.73M2
ESTIMATED AVERAGE GLUCOSE: 108 MG/DL
GLUCOSE SERPL-MCNC: 84 MG/DL
HBA1C MFR BLD HPLC: 5.4 %
HDLC SERPL-MCNC: 56 MG/DL
LDLC SERPL CALC-MCNC: 116 MG/DL
NONHDLC SERPL-MCNC: 153 MG/DL
POTASSIUM SERPL-SCNC: 4.3 MMOL/L
PROT SERPL-MCNC: 7.1 G/DL
SODIUM SERPL-SCNC: 141 MMOL/L
TRIGL SERPL-MCNC: 215 MG/DL

## 2024-03-14 ENCOUNTER — TRANSCRIPTION ENCOUNTER (OUTPATIENT)
Age: 44
End: 2024-03-14

## 2024-03-14 RX ORDER — ROSUVASTATIN CALCIUM 10 MG/1
10 TABLET, FILM COATED ORAL
Qty: 90 | Refills: 1 | Status: ACTIVE | COMMUNITY
Start: 2021-09-08 | End: 1900-01-01

## 2024-04-12 DIAGNOSIS — D25.9 LEIOMYOMA OF UTERUS, UNSPECIFIED: ICD-10-CM

## 2024-04-15 ENCOUNTER — APPOINTMENT (OUTPATIENT)
Dept: OBGYN | Facility: CLINIC | Age: 44
End: 2024-04-15
Payer: COMMERCIAL

## 2024-04-15 ENCOUNTER — RESULT REVIEW (OUTPATIENT)
Age: 44
End: 2024-04-15

## 2024-04-15 ENCOUNTER — ASOB RESULT (OUTPATIENT)
Age: 44
End: 2024-04-15

## 2024-04-15 VITALS
WEIGHT: 180 LBS | HEART RATE: 94 BPM | HEIGHT: 62 IN | SYSTOLIC BLOOD PRESSURE: 121 MMHG | BODY MASS INDEX: 33.13 KG/M2 | DIASTOLIC BLOOD PRESSURE: 87 MMHG

## 2024-04-15 DIAGNOSIS — Z87.42 PERSONAL HISTORY OF OTHER DISEASES OF THE FEMALE GENITAL TRACT: ICD-10-CM

## 2024-04-15 DIAGNOSIS — N76.0 ACUTE VAGINITIS: ICD-10-CM

## 2024-04-15 PROCEDURE — 99213 OFFICE O/P EST LOW 20 MIN: CPT | Mod: 25

## 2024-04-15 PROCEDURE — 99396 PREV VISIT EST AGE 40-64: CPT

## 2024-04-15 PROCEDURE — 99459 PELVIC EXAMINATION: CPT

## 2024-04-15 PROCEDURE — 76830 TRANSVAGINAL US NON-OB: CPT

## 2024-04-15 RX ORDER — METRONIDAZOLE 500 MG/1
500 TABLET ORAL TWICE DAILY
Qty: 10 | Refills: 0 | Status: ACTIVE | COMMUNITY
Start: 2024-04-15 | End: 1900-01-01

## 2024-04-15 RX ORDER — TERCONAZOLE 80 MG/1
80 SUPPOSITORY VAGINAL
Qty: 1 | Refills: 0 | Status: ACTIVE | COMMUNITY
Start: 2024-04-15 | End: 1900-01-01

## 2024-04-15 NOTE — HISTORY OF PRESENT ILLNESS
[N] : Patient reports normal menses [IUD] : has an intrauterine device [Y] : Patient is sexually active [FreeTextEntry1] : GYN Annual [TextBox_4] : 42YO patient presents for GYN annual. She c/o vaginal discharge and feels itching / pruritus of the vulvovaginal area  Hx of PCOS and on last visit noted with ovarian cyst Left ovarian cyst 3.5cm  [Mammogramdate] : 6/16/23 [BreastSonogramDate] : 6/16/23 6/16/23 [PapSmeardate] : 4/3/23 [HPVDate] : 4/3/23 [LMPDate] : Mirena IUD [PGHxTotal] : 2 [[facility] : 2

## 2024-04-15 NOTE — PROCEDURE
[Cervical Pap Smear] : cervical Pap smear [Liquid Base] : liquid base [Tolerated Well] : the patient tolerated the procedure well [No Complications] : there were no complications [de-identified] : Vaginitis Plus Panel

## 2024-04-15 NOTE — PLAN
[FreeTextEntry1] : Annual gyn exam  PCOS- Amenorrhea, with Mirena IUD  Vulvovaginitis Rx for Flagyl /Terazol supp.  Rx Mammogram /B sonogram  TV Sonogram = small submucosal myoma Mirena in proper place and PCOS ovaries  plan follow up in 6 months to check myoma for any change - as patient asymptomatic and no menses with Mirena IUD in proper position.

## 2024-04-15 NOTE — DISCUSSION/SUMMARY
[FreeTextEntry1] :   I, Tito reardon acting as scribe for Dr. Nicole. 04/15/2024     The documentation recorded by the scribe, in my presence, accurately reflects the service I personally performed, and the decisions made by me with my edits as appropriate on 04/15/2024  Marianna Nicole MD, FACOG

## 2024-04-15 NOTE — PHYSICAL EXAM
[Chaperone Present] : A chaperone was present in the examining room during all aspects of the physical examination [[id number]] : A chaperone was present during the pelvic exam. [Appropriately responsive] : appropriately responsive [Alert] : alert [No Acute Distress] : no acute distress [No Lymphadenopathy] : no lymphadenopathy [Soft] : soft [Non-tender] : non-tender [Non-distended] : non-distended [Oriented x3] : oriented x3 [Examination Of The Breasts] : a normal appearance [No Masses] : no breast masses were palpable [Labia Majora] : normal [Labia Minora] : normal [Breast Palpation Diffuse Fibrous Tissue Bilateral] : fibrocystic changes [Discharge] : a  ~M vaginal discharge was present [Papo] : yellow [Thick] : thick [Normal] : normal [Uterine Adnexae] : normal [FreeTextEntry2] : Tito Mahmood MA [FreeTextEntry8] : limited exam due to body habitus

## 2024-04-16 LAB — HPV HIGH+LOW RISK DNA PNL CVX: NOT DETECTED

## 2024-04-18 DIAGNOSIS — B37.9 CANDIDIASIS, UNSPECIFIED: ICD-10-CM

## 2024-04-18 RX ORDER — FLUCONAZOLE 150 MG/1
150 TABLET ORAL
Qty: 2 | Refills: 0 | Status: ACTIVE | COMMUNITY
Start: 2024-04-18 | End: 1900-01-01

## 2024-05-06 ENCOUNTER — APPOINTMENT (OUTPATIENT)
Dept: ENDOCRINOLOGY | Facility: CLINIC | Age: 44
End: 2024-05-06
Payer: COMMERCIAL

## 2024-05-06 VITALS
DIASTOLIC BLOOD PRESSURE: 79 MMHG | HEIGHT: 62 IN | OXYGEN SATURATION: 99 % | WEIGHT: 180 LBS | BODY MASS INDEX: 33.13 KG/M2 | SYSTOLIC BLOOD PRESSURE: 117 MMHG | HEART RATE: 85 BPM

## 2024-05-06 DIAGNOSIS — E66.9 OBESITY, UNSPECIFIED: ICD-10-CM

## 2024-05-06 DIAGNOSIS — E78.5 HYPERLIPIDEMIA, UNSPECIFIED: ICD-10-CM

## 2024-05-06 DIAGNOSIS — E11.9 TYPE 2 DIABETES MELLITUS W/OUT COMPLICATIONS: ICD-10-CM

## 2024-05-06 DIAGNOSIS — E28.2 POLYCYSTIC OVARIAN SYNDROME: ICD-10-CM

## 2024-05-06 PROCEDURE — 99214 OFFICE O/P EST MOD 30 MIN: CPT

## 2024-05-06 PROCEDURE — 36415 COLL VENOUS BLD VENIPUNCTURE: CPT

## 2024-05-06 RX ORDER — SEMAGLUTIDE 1.34 MG/ML
4 INJECTION, SOLUTION SUBCUTANEOUS
Qty: 2 | Refills: 11 | Status: ACTIVE | COMMUNITY
Start: 2023-11-01 | End: 1900-01-01

## 2024-05-06 NOTE — PHYSICAL EXAM
[Alert] : alert [No Acute Distress] : no acute distress [Normal Sclera/Conjunctiva] : normal sclera/conjunctiva [EOMI] : extra ocular movement intact [No LAD] : no lymphadenopathy [Thyroid Not Enlarged] : the thyroid was not enlarged [No Thyroid Nodules] : no palpable thyroid nodules [No Accessory Muscle Use] : no accessory muscle use [Clear to Auscultation] : lungs were clear to auscultation bilaterally [Normal S1, S2] : normal S1 and S2 [Regular Rhythm] : with a regular rhythm [No Edema] : no peripheral edema [Normal Bowel Sounds] : normal bowel sounds [Not Tender] : non-tender [Not Distended] : not distended [Soft] : abdomen soft [Normal Anterior Cervical Nodes] : no anterior cervical lymphadenopathy [No Stigmata of Cushings Syndrome] : no stigmata of Cushings Syndrome [No Clubbing, Cyanosis] : no clubbing  or cyanosis of the fingernails [Acanthosis Nigricans] : acanthosis nigricans present [Right foot was examined, including] : right foot ~C was examined, including visual inspection with sensory and pulse exams [Left foot was examined, including] : left foot ~C was examined, including visual inspection with sensory and pulse exams [Normal] : normal [2+] : 2+ in the dorsalis pedis [Normal Reflexes] : deep tendon reflexes were 2+ and symmetric [Normal Affect] : the affect was normal [Normal Mood] : the mood was normal [Abdominal Striae] : no abdominal striae [Diminished Throughout Both Feet] : normal tactile sensation with monofilament testing throughout both feet

## 2024-05-06 NOTE — REVIEW OF SYSTEMS
[Fatigue] : fatigue [Irregular Menses] : irregular menses [Myalgia] : myalgia  [Back Pain] : back pain [Hirsutism] : hirsutism [Cold Intolerance] : cold intolerance [Heat Intolerance] : heat intolerance [Swelling] : swelling [Negative] : Neurological [Recent Weight Gain (___ Lbs)] : no recent weight gain [Recent Weight Loss (___ Lbs)] : recent weight loss: [unfilled] lbs [Neck Pain] : no neck pain [Dysphonia] : no dysphonia [Heartburn] : heartburn [Incontinence] : no incontinence [Acne] : no acne [Hair Loss] : no hair loss [Polydipsia] : no polydipsia [FreeTextEntry3] : past due for eye exam [FreeTextEntry4] : sometimes choking sensation [de-identified] : irritable

## 2024-05-06 NOTE — ASSESSMENT
[FreeTextEntry1] : 43 y.o female with h/o PCOS, hirsutism, obesity and fatty liver disease.  1. PCOS with hirsutism- Discussed pathophysiology of PCOS including hyperandrogenism and insulin resistance. Encouraged a carbohydrate consistent diet and exercise. Will continue Metformin 1,000 mg BID. Reviewed GI side effects and proper intake of Metformin. Regarding hirsutism, may consider starting Spironolactone. Reviewed risks and benefits of Spironolactone including hyperkalemia. UTD with DHEAS, testosterone, 17 OHP, and midnight salivary cortisol level.  2. Type 2 DM with Obesity- Hba1c is 5.4% in March 2024. Encouraged a carbohydrate consistent diet and exercise. Had normal midnight salivary cortisol level. Normal TFTs. Will increase Ozempic to 1 mg SQ weekly. Reviewed risks and benefits of GLP-1 agonists including GI side effects, pancreatitis and MTC. Will continue Metformin 1,000 mg BID. Will check CMP and urine alb/cr ratio.   3. Fatty Liver disease- Encouraged weight loss. Recommend GI/hepatology evaluation. Will continue GLP-1 agonist.  4. Hyperlipidemia- Will check lipids. Will continue Rosuvastatin 10 mg daily.   Follow up in 4 to 6 months.  Labs drawn in the office today.

## 2024-05-06 NOTE — REASON FOR VISIT
[Follow - Up] : a follow-up visit [Weight Management/Obesity] : weight management/obesity [PCOS] : PCOS [DM Type 2] : DM Type 2

## 2024-05-06 NOTE — HISTORY OF PRESENT ILLNESS
[FreeTextEntry1] : 43 y.o. female with h/o PCOS, Type 2 DM diagnosed in 2023 and obesity who presents for follow up visit.   In regard to PCOS diagnosed at age 20, reports h/o infertility. She tried IVF without success and then conceived naturally for both her pregnancies. Now using Mirena IUD. Sees GYN yearly.   In regard to Type 2 DM, reports eating less.  Takes Metformin 1,000 mg BID and Ozempic 0.5 mg SQ weekly. She does get GERD and prior to GLP-1 RA.   Does not monitor FS at home and not interested in CGMS yet.   Baseline weight was 170 after second pregnancy. Reports weight gain of 15 pounds since 2019. She did lose 5 pounds since starting Ozempic.   Diet: Breakfast: warm milk, eggs or cranberry muffin Lunch: salad or 1/2 sandwich or 1/2 wrap or sushi Dinner: rice, string beans/asparagus, chicken or fish Snacks: less and may have fruit.  Drinks: water, decaf regular coke  No exercise.     Sees uro/gyn and had MUS on 4/6/23.   Diagnosed with JANN and uses CPAP.   H/o GDM in 2018 with baby boy in 2017 and 2019. Type 2 DM in 2023-  Past due for ophthalmology. No foot complaints. No kidney disease.   Takes magnesium 500 mg daily, milk thistle, and gall bladder supplement. Also takes vitamin C and MVI.   She reports fatigue, cold and heat intolerance, myalgias with back pain and LE swelling.  She reports increase in facial hair. No hair loss and no acne now.   In regard to hyperlipidemia, takes Rosuvastatin 10 mg daily.

## 2024-05-07 LAB
ALBUMIN SERPL ELPH-MCNC: 4.7 G/DL
ALP BLD-CCNC: 88 U/L
ALT SERPL-CCNC: 25 U/L
ANION GAP SERPL CALC-SCNC: 14 MMOL/L
AST SERPL-CCNC: 19 U/L
BILIRUB SERPL-MCNC: 0.5 MG/DL
BUN SERPL-MCNC: 10 MG/DL
CALCIUM SERPL-MCNC: 9.4 MG/DL
CHLORIDE SERPL-SCNC: 103 MMOL/L
CHOLEST SERPL-MCNC: 156 MG/DL
CO2 SERPL-SCNC: 23 MMOL/L
CREAT SERPL-MCNC: 0.5 MG/DL
CREAT SPEC-SCNC: 90 MG/DL
EGFR: 121 ML/MIN/1.73M2
GLUCOSE SERPL-MCNC: 87 MG/DL
HDLC SERPL-MCNC: 49 MG/DL
LDLC SERPL CALC-MCNC: 80 MG/DL
MICROALBUMIN 24H UR DL<=1MG/L-MCNC: 3 MG/DL
MICROALBUMIN/CREAT 24H UR-RTO: 33 MG/G
NONHDLC SERPL-MCNC: 106 MG/DL
POTASSIUM SERPL-SCNC: 4.1 MMOL/L
PROT SERPL-MCNC: 7.2 G/DL
SODIUM SERPL-SCNC: 140 MMOL/L
TRIGL SERPL-MCNC: 155 MG/DL

## 2024-05-28 LAB
A VAGINAE DNA VAG QL NAA+PROBE: NORMAL
BVAB2 DNA VAG QL NAA+PROBE: NORMAL
C KRUSEI DNA VAG QL NAA+PROBE: NEGATIVE
C KRUSEI DNA VAG QL NAA+PROBE: POSITIVE
C TRACH RRNA SPEC QL NAA+PROBE: NEGATIVE
CANDIDA DNA VAG QL NAA+PROBE: NEGATIVE
CYTOLOGY CVX/VAG DOC THIN PREP: NORMAL
MEGA1 DNA VAG QL NAA+PROBE: NORMAL
N GONORRHOEA RRNA SPEC QL NAA+PROBE: NEGATIVE
T VAGINALIS RRNA SPEC QL NAA+PROBE: NEGATIVE

## 2024-07-03 DIAGNOSIS — N76.0 ACUTE VAGINITIS: ICD-10-CM

## 2024-07-03 RX ORDER — FLUCONAZOLE 150 MG/1
150 TABLET ORAL
Qty: 2 | Refills: 0 | Status: ACTIVE | COMMUNITY
Start: 2024-07-03 | End: 1900-01-01

## 2024-07-26 NOTE — ASU PREOP CHECKLIST - PATIENT'S PERSONAL PROPERTY GIVEN TO
Problem: Adult Inpatient Plan of Care  Goal: Plan of Care Review  Outcome: Progressing      on unit

## 2024-08-23 ENCOUNTER — RX RENEWAL (OUTPATIENT)
Age: 44
End: 2024-08-23

## 2024-10-21 ENCOUNTER — APPOINTMENT (OUTPATIENT)
Dept: OBGYN | Facility: CLINIC | Age: 44
End: 2024-10-21

## 2024-11-18 ENCOUNTER — APPOINTMENT (OUTPATIENT)
Dept: ENDOCRINOLOGY | Facility: CLINIC | Age: 44
End: 2024-11-18
Payer: COMMERCIAL

## 2024-11-18 VITALS
HEIGHT: 62 IN | WEIGHT: 177 LBS | BODY MASS INDEX: 32.57 KG/M2 | SYSTOLIC BLOOD PRESSURE: 110 MMHG | DIASTOLIC BLOOD PRESSURE: 70 MMHG

## 2024-11-18 VITALS
HEIGHT: 62 IN | HEART RATE: 92 BPM | DIASTOLIC BLOOD PRESSURE: 72 MMHG | BODY MASS INDEX: 32.67 KG/M2 | SYSTOLIC BLOOD PRESSURE: 112 MMHG | WEIGHT: 177.56 LBS | OXYGEN SATURATION: 98 %

## 2024-11-18 DIAGNOSIS — E55.9 VITAMIN D DEFICIENCY, UNSPECIFIED: ICD-10-CM

## 2024-11-18 DIAGNOSIS — E11.9 TYPE 2 DIABETES MELLITUS W/OUT COMPLICATIONS: ICD-10-CM

## 2024-11-18 DIAGNOSIS — E78.5 HYPERLIPIDEMIA, UNSPECIFIED: ICD-10-CM

## 2024-11-18 DIAGNOSIS — E28.2 POLYCYSTIC OVARIAN SYNDROME: ICD-10-CM

## 2024-11-18 DIAGNOSIS — E66.9 OBESITY, UNSPECIFIED: ICD-10-CM

## 2024-11-18 DIAGNOSIS — L68.0 HIRSUTISM: ICD-10-CM

## 2024-11-18 LAB — HBA1C MFR BLD HPLC: 5.6

## 2024-11-18 PROCEDURE — 99214 OFFICE O/P EST MOD 30 MIN: CPT

## 2024-11-18 PROCEDURE — 83036 HEMOGLOBIN GLYCOSYLATED A1C: CPT | Mod: QW

## 2024-11-18 RX ORDER — LANCETS
EACH MISCELLANEOUS
Qty: 100 | Refills: 3 | Status: DISCONTINUED | COMMUNITY
Start: 2024-11-18 | End: 2024-11-18

## 2024-11-18 RX ORDER — BLOOD-GLUCOSE METER
W/DEVICE KIT MISCELLANEOUS
Qty: 1 | Refills: 0 | Status: DISCONTINUED | COMMUNITY
Start: 2024-11-18 | End: 2024-11-18

## 2024-11-18 RX ORDER — FAMOTIDINE 40 MG/1
40 TABLET, FILM COATED ORAL
Refills: 0 | Status: ACTIVE | COMMUNITY

## 2024-11-18 RX ORDER — BLOOD SUGAR DIAGNOSTIC
STRIP MISCELLANEOUS
Qty: 100 | Refills: 3 | Status: DISCONTINUED | COMMUNITY
Start: 2024-11-18 | End: 2024-11-18

## 2024-11-19 LAB
25(OH)D3 SERPL-MCNC: 44.9 NG/ML
ALBUMIN SERPL ELPH-MCNC: 4.5 G/DL
ALP BLD-CCNC: 81 U/L
ALT SERPL-CCNC: 29 U/L
ANION GAP SERPL CALC-SCNC: 12 MMOL/L
AST SERPL-CCNC: 21 U/L
BILIRUB SERPL-MCNC: 0.4 MG/DL
BUN SERPL-MCNC: 14 MG/DL
CALCIUM SERPL-MCNC: 9.3 MG/DL
CHLORIDE SERPL-SCNC: 102 MMOL/L
CHOLEST SERPL-MCNC: 164 MG/DL
CK SERPL-CCNC: 80 U/L
CO2 SERPL-SCNC: 25 MMOL/L
CREAT SERPL-MCNC: 0.43 MG/DL
CREAT SPEC-SCNC: 88 MG/DL
EGFR: 124 ML/MIN/1.73M2
FOLATE SERPL-MCNC: >20 NG/ML
GLUCOSE SERPL-MCNC: 86 MG/DL
HCT VFR BLD CALC: 44.7 %
HDLC SERPL-MCNC: 52 MG/DL
HGB BLD-MCNC: 13.5 G/DL
LDLC SERPL CALC-MCNC: 79 MG/DL
MCHC RBC-ENTMCNC: 27.2 PG
MCHC RBC-ENTMCNC: 30.2 G/DL
MCV RBC AUTO: 90.1 FL
MICROALBUMIN 24H UR DL<=1MG/L-MCNC: 2.1 MG/DL
MICROALBUMIN/CREAT 24H UR-RTO: 24 MG/G
NONHDLC SERPL-MCNC: 112 MG/DL
PLATELET # BLD AUTO: 267 K/UL
POTASSIUM SERPL-SCNC: 4.3 MMOL/L
PROT SERPL-MCNC: 7.1 G/DL
RBC # BLD: 4.96 M/UL
RBC # FLD: 13.6 %
SODIUM SERPL-SCNC: 139 MMOL/L
TRIGL SERPL-MCNC: 195 MG/DL
TSH SERPL-ACNC: 0.7 UIU/ML
VIT B12 SERPL-MCNC: 723 PG/ML
WBC # FLD AUTO: 7.45 K/UL

## 2024-11-27 RX ORDER — BLOOD-GLUCOSE METER
W/DEVICE EACH MISCELLANEOUS
Qty: 1 | Refills: 0 | Status: DISCONTINUED | COMMUNITY
Start: 2024-11-18 | End: 2024-11-27

## 2024-11-27 RX ORDER — LANCETS
EACH MISCELLANEOUS
Qty: 1 | Refills: 3 | Status: ACTIVE | COMMUNITY
Start: 2024-11-27 | End: 1900-01-01

## 2024-11-27 RX ORDER — BLOOD-GLUCOSE METER
W/DEVICE EACH MISCELLANEOUS
Qty: 1 | Refills: 0 | Status: ACTIVE | COMMUNITY
Start: 2024-11-27 | End: 1900-01-01

## 2024-11-27 RX ORDER — LANCETS 33 GAUGE
EACH MISCELLANEOUS
Qty: 100 | Refills: 3 | Status: DISCONTINUED | COMMUNITY
Start: 2024-11-18 | End: 2024-11-27

## 2024-11-27 RX ORDER — BLOOD SUGAR DIAGNOSTIC
STRIP MISCELLANEOUS
Qty: 1 | Refills: 3 | Status: DISCONTINUED | COMMUNITY
Start: 2024-11-18 | End: 2024-11-27

## 2024-11-27 RX ORDER — BLOOD SUGAR DIAGNOSTIC
STRIP MISCELLANEOUS
Qty: 1 | Refills: 3 | Status: ACTIVE | COMMUNITY
Start: 2024-11-27 | End: 1900-01-01

## 2025-02-26 ENCOUNTER — TRANSCRIPTION ENCOUNTER (OUTPATIENT)
Age: 45
End: 2025-02-26

## 2025-03-04 NOTE — PATIENT PROFILE OB - NS PRO AD PATIENT TYPE
Patient Specific Counseling (Will Not Stick From Patient To Patient): Status quo based on photos and I don't think we'll get past this point with a simple topical regimen. I noted that Dr Castano is an expert in pigmentary issues and suggested a consultation to see if she can try other options to push closer to clearance.  Detail Level: Simple Detail Level: Detailed Health Care Proxy (HCP)

## 2025-03-06 ENCOUNTER — TRANSCRIPTION ENCOUNTER (OUTPATIENT)
Age: 45
End: 2025-03-06

## 2025-05-06 ENCOUNTER — APPOINTMENT (OUTPATIENT)
Dept: OBGYN | Facility: CLINIC | Age: 45
End: 2025-05-06
Payer: COMMERCIAL

## 2025-05-06 ENCOUNTER — ASOB RESULT (OUTPATIENT)
Age: 45
End: 2025-05-06

## 2025-05-06 ENCOUNTER — NON-APPOINTMENT (OUTPATIENT)
Age: 45
End: 2025-05-06

## 2025-05-06 VITALS
WEIGHT: 173 LBS | DIASTOLIC BLOOD PRESSURE: 83 MMHG | BODY MASS INDEX: 31.83 KG/M2 | SYSTOLIC BLOOD PRESSURE: 125 MMHG | HEIGHT: 62 IN | HEART RATE: 91 BPM

## 2025-05-06 DIAGNOSIS — R10.2 PELVIC AND PERINEAL PAIN: ICD-10-CM

## 2025-05-06 DIAGNOSIS — Z12.4 ENCOUNTER FOR SCREENING FOR MALIGNANT NEOPLASM OF CERVIX: ICD-10-CM

## 2025-05-06 DIAGNOSIS — Z11.51 ENCOUNTER FOR SCREENING FOR HUMAN PAPILLOMAVIRUS (HPV): ICD-10-CM

## 2025-05-06 DIAGNOSIS — Z01.411 ENCOUNTER FOR GYNECOLOGICAL EXAMINATION (GENERAL) (ROUTINE) WITH ABNORMAL FINDINGS: ICD-10-CM

## 2025-05-06 DIAGNOSIS — Z30.431 ENCOUNTER FOR ROUTINE CHECKING OF INTRAUTERINE CONTRACEPTIVE DEVICE: ICD-10-CM

## 2025-05-06 DIAGNOSIS — N93.9 ABNORMAL UTERINE AND VAGINAL BLEEDING, UNSPECIFIED: ICD-10-CM

## 2025-05-06 DIAGNOSIS — N93.0 POSTCOITAL AND CONTACT BLEEDING: ICD-10-CM

## 2025-05-06 DIAGNOSIS — N60.19 DIFFUSE CYSTIC MASTOPATHY OF UNSPECIFIED BREAST: ICD-10-CM

## 2025-05-06 DIAGNOSIS — Z12.39 ENCOUNTER FOR OTHER SCREENING FOR MALIGNANT NEOPLASM OF BREAST: ICD-10-CM

## 2025-05-06 PROCEDURE — 99215 OFFICE O/P EST HI 40 MIN: CPT | Mod: 25

## 2025-05-06 PROCEDURE — 99459 PELVIC EXAMINATION: CPT

## 2025-05-06 PROCEDURE — 99396 PREV VISIT EST AGE 40-64: CPT

## 2025-05-06 PROCEDURE — 76830 TRANSVAGINAL US NON-OB: CPT

## 2025-05-06 RX ORDER — LEVONORGESTREL 52 MG/1
20 INTRAUTERINE DEVICE INTRAUTERINE
Qty: 1 | Refills: 0 | Status: ACTIVE | COMMUNITY
Start: 2025-05-06 | End: 1900-01-01

## 2025-05-09 ENCOUNTER — NON-APPOINTMENT (OUTPATIENT)
Age: 45
End: 2025-05-09

## 2025-05-09 LAB — HPV HIGH+LOW RISK DNA PNL CVX: NOT DETECTED

## 2025-05-13 ENCOUNTER — NON-APPOINTMENT (OUTPATIENT)
Age: 45
End: 2025-05-13

## 2025-05-13 LAB — CYTOLOGY CVX/VAG DOC THIN PREP: NORMAL

## 2025-05-19 ENCOUNTER — APPOINTMENT (OUTPATIENT)
Dept: ENDOCRINOLOGY | Facility: CLINIC | Age: 45
End: 2025-05-19
Payer: COMMERCIAL

## 2025-05-19 VITALS
DIASTOLIC BLOOD PRESSURE: 76 MMHG | WEIGHT: 172 LBS | BODY MASS INDEX: 31.65 KG/M2 | HEIGHT: 62 IN | SYSTOLIC BLOOD PRESSURE: 120 MMHG

## 2025-05-19 VITALS
BODY MASS INDEX: 31.28 KG/M2 | SYSTOLIC BLOOD PRESSURE: 120 MMHG | OXYGEN SATURATION: 99 % | HEART RATE: 85 BPM | DIASTOLIC BLOOD PRESSURE: 76 MMHG | HEIGHT: 62 IN | WEIGHT: 170 LBS

## 2025-05-19 DIAGNOSIS — E66.9 OBESITY, UNSPECIFIED: ICD-10-CM

## 2025-05-19 DIAGNOSIS — E78.5 HYPERLIPIDEMIA, UNSPECIFIED: ICD-10-CM

## 2025-05-19 DIAGNOSIS — E11.9 TYPE 2 DIABETES MELLITUS W/OUT COMPLICATIONS: ICD-10-CM

## 2025-05-19 DIAGNOSIS — E55.9 VITAMIN D DEFICIENCY, UNSPECIFIED: ICD-10-CM

## 2025-05-19 DIAGNOSIS — E28.2 POLYCYSTIC OVARIAN SYNDROME: ICD-10-CM

## 2025-05-19 DIAGNOSIS — L68.0 HIRSUTISM: ICD-10-CM

## 2025-05-19 LAB — HBA1C MFR BLD HPLC: 5.3

## 2025-05-19 PROCEDURE — 83036 HEMOGLOBIN GLYCOSYLATED A1C: CPT | Mod: QW

## 2025-05-19 PROCEDURE — 99214 OFFICE O/P EST MOD 30 MIN: CPT

## 2025-06-16 ENCOUNTER — APPOINTMENT (OUTPATIENT)
Dept: OBGYN | Facility: CLINIC | Age: 45
End: 2025-06-16
Payer: COMMERCIAL

## 2025-06-16 ENCOUNTER — ASOB RESULT (OUTPATIENT)
Age: 45
End: 2025-06-16

## 2025-06-16 VITALS
SYSTOLIC BLOOD PRESSURE: 119 MMHG | HEIGHT: 62 IN | HEART RATE: 109 BPM | DIASTOLIC BLOOD PRESSURE: 77 MMHG | WEIGHT: 175 LBS | BODY MASS INDEX: 32.2 KG/M2

## 2025-06-16 PROBLEM — N76.0 ACUTE VAGINITIS: Status: ACTIVE | Noted: 2025-06-16 | Resolved: 2025-06-30

## 2025-06-16 PROBLEM — Z30.432 ENCOUNTER FOR IUD REMOVAL: Status: ACTIVE | Noted: 2025-06-16 | Resolved: 2025-06-30

## 2025-06-16 PROCEDURE — 99213 OFFICE O/P EST LOW 20 MIN: CPT | Mod: 25

## 2025-06-16 PROCEDURE — 76857 US EXAM PELVIC LIMITED: CPT

## 2025-06-16 PROCEDURE — 58301 REMOVE INTRAUTERINE DEVICE: CPT

## 2025-06-16 PROCEDURE — 58300 INSERT INTRAUTERINE DEVICE: CPT

## 2025-06-17 PROBLEM — Z30.430 ENCOUNTER FOR INSERTION OF MIRENA IUD: Status: ACTIVE | Noted: 2025-06-17

## 2025-06-19 LAB
A VAGINAE DNA VAG QL NAA+PROBE: NORMAL
BVAB2 DNA VAG QL NAA+PROBE: NORMAL
C KRUSEI DNA VAG QL NAA+PROBE: NEGATIVE
C KRUSEI DNA VAG QL NAA+PROBE: POSITIVE
C TRACH RRNA SPEC QL NAA+PROBE: NEGATIVE
CANDIDA DNA VAG QL NAA+PROBE: NEGATIVE
MEGA1 DNA VAG QL NAA+PROBE: NORMAL
N GONORRHOEA RRNA SPEC QL NAA+PROBE: NEGATIVE
T VAGINALIS RRNA SPEC QL NAA+PROBE: NEGATIVE

## 2025-06-22 LAB — ACTINOMYCES CULTURE FOR IUD: NORMAL

## 2025-08-06 ENCOUNTER — RX RENEWAL (OUTPATIENT)
Age: 45
End: 2025-08-06

## (undated) DEVICE — LONE STAR RETRACTOR RING 32.5CM X 18.3CM DISP

## (undated) DEVICE — ELCTR PENCIL NEPTUNE SMOKE EVACUATION

## (undated) DEVICE — PREP BETADINE SPONGE STICKS

## (undated) DEVICE — DRSG DERMABOND 0.7ML

## (undated) DEVICE — SYR LUER LOK 10CC

## (undated) DEVICE — DRAPE HALF SHEET 40X57"

## (undated) DEVICE — GAMMA SLEEVE DISPOSABLE

## (undated) DEVICE — NDL HYPO SAFE 18G X 1.5" (PINK)

## (undated) DEVICE — SUT DERMABOND 0.7ML

## (undated) DEVICE — MARKER SKIN MULTI TIP 6"

## (undated) DEVICE — FOLEY CATH 2-WAY 18FR 5CC SILICONE

## (undated) DEVICE — WRAP COMPRESSION CALF MED

## (undated) DEVICE — DRAPE TOWEL BLUE 17" X 24"

## (undated) DEVICE — SPECIMEN CONTAINER 100ML

## (undated) DEVICE — PACK MINOR

## (undated) DEVICE — DRAPE MAYO STAND 30"

## (undated) DEVICE — SUT MAXON 2-0 30" GS-22

## (undated) DEVICE — SUT POLYSORB 0 18" GS-21

## (undated) DEVICE — SYR SAFE TB 1CC 27G X 0.5

## (undated) DEVICE — SUT POLYSORB 3-0 30" V-20 UNDYED

## (undated) DEVICE — LONE STAR ELASTIC STAY HOOK 5MM SHARP

## (undated) DEVICE — ELCTR PENCIL SMOKE EVACUATION

## (undated) DEVICE — SUT POLYSORB 2-0 18" V-20

## (undated) DEVICE — BLANKET WARMER LOWER ADULT

## (undated) DEVICE — NDL HYPO SAFE 25G X 1.5"

## (undated) DEVICE — DRAIN RESERVOIR FOR JACKSON PRATT 100CC CARDINAL

## (undated) DEVICE — GLV 6.5 PROTEXIS (WHITE)

## (undated) DEVICE — DRAPE INSTRUMENT POUCH 6.75" X 11"

## (undated) DEVICE — DRAPE LAVH 124" X 30" X125"

## (undated) DEVICE — GLV 7.5 PROTEXIS

## (undated) DEVICE — SUT SOFSILK 2-0 18" C-23

## (undated) DEVICE — POSITIONER FOAM EGG CRATE ULNAR 2PCS (PINK)

## (undated) DEVICE — SUT POLYSORB 2-0 30" V-20 UNDYED

## (undated) DEVICE — SOL IRR BAG H2O 3000ML

## (undated) DEVICE — SPONGE PEANUT AUTO COUNT

## (undated) DEVICE — FOLEY CATH 2-WAY 16FR 5CC LATEX

## (undated) DEVICE — SUT POLYSORB 0 30" GS-21 UNDYED

## (undated) DEVICE — LAP PAD 18 X 18"

## (undated) DEVICE — LIGASURE SM JAW 16.5MM 18CM

## (undated) DEVICE — SUT DERMABOND PRINEO 60CM

## (undated) DEVICE — SOL IRR POUR H2O 250ML

## (undated) DEVICE — TUBING SUCTION 20FT

## (undated) DEVICE — SOL IRR POUR NS 0.9% 500ML

## (undated) DEVICE — DRAPE INSTRUMENT POUCH

## (undated) DEVICE — POSITIONER FOAM HEADREST

## (undated) DEVICE — TUBING TUR 2 PRONG

## (undated) DEVICE — WARMING BLANKET UPPER ADULT

## (undated) DEVICE — DRAIN JACKSON PRATT 7MM FLAT FULL NO TROCAR

## (undated) DEVICE — VENODYNE/SCD SLEEVE CALF LARGE

## (undated) DEVICE — FOLEY TRAY 16FR 5CC LTX UMETER CLOSED

## (undated) DEVICE — CONTAINER SPECIMEN 100ML

## (undated) DEVICE — SUT MONOSOF 4-0 18" C-13

## (undated) DEVICE — DRAPE 3/4 SHEET 52X76"

## (undated) DEVICE — SUT MONOCRYL 4-0 27" PS-2 UNDYED

## (undated) DEVICE — DRAPE MAGNETIC INSTRUMENT MEDIUM

## (undated) DEVICE — DRSG XEROFORM 5"

## (undated) DEVICE — MEDICATION LABELS W MARKER